# Patient Record
Sex: FEMALE | Race: WHITE | HISPANIC OR LATINO | Employment: UNEMPLOYED | URBAN - METROPOLITAN AREA
[De-identification: names, ages, dates, MRNs, and addresses within clinical notes are randomized per-mention and may not be internally consistent; named-entity substitution may affect disease eponyms.]

---

## 2020-01-14 ENCOUNTER — APPOINTMENT (OUTPATIENT)
Dept: OBGYN | Facility: CLINIC | Age: 30
End: 2020-01-14

## 2020-01-15 ENCOUNTER — HOSPITAL ENCOUNTER (OUTPATIENT)
Facility: MEDICAL CENTER | Age: 30
End: 2020-01-15
Attending: ADVANCED PRACTICE MIDWIFE
Payer: COMMERCIAL

## 2020-01-15 ENCOUNTER — HOSPITAL ENCOUNTER (OUTPATIENT)
Dept: LAB | Facility: MEDICAL CENTER | Age: 30
End: 2020-01-15
Attending: ADVANCED PRACTICE MIDWIFE
Payer: MEDICAID

## 2020-01-15 ENCOUNTER — INITIAL PRENATAL (OUTPATIENT)
Dept: OBGYN | Facility: CLINIC | Age: 30
End: 2020-01-15
Payer: MEDICAID

## 2020-01-15 VITALS
HEIGHT: 57 IN | BODY MASS INDEX: 30.63 KG/M2 | SYSTOLIC BLOOD PRESSURE: 110 MMHG | DIASTOLIC BLOOD PRESSURE: 64 MMHG | WEIGHT: 142 LBS

## 2020-01-15 DIAGNOSIS — Z34.83 ENCOUNTER FOR SUPERVISION OF OTHER NORMAL PREGNANCY, THIRD TRIMESTER: ICD-10-CM

## 2020-01-15 LAB
ABO GROUP BLD: NORMAL
BACTERIA #/AREA URNS HPF: ABNORMAL /HPF
BLD GP AB SCN SERPL QL: NORMAL
EPI CELLS #/AREA URNS HPF: ABNORMAL /HPF
HYALINE CASTS #/AREA URNS LPF: ABNORMAL /LPF
RBC # URNS HPF: ABNORMAL /HPF
RH BLD: NORMAL
WBC #/AREA URNS HPF: ABNORMAL /HPF

## 2020-01-15 PROCEDURE — 59402 PR NEW OB HIGH RISK: CPT | Performed by: ADVANCED PRACTICE MIDWIFE

## 2020-01-15 NOTE — PROGRESS NOTES
Patient friend in room identifying herself as the patient's . During this visit, medical assistant used as .     Subjective:   Fiona Alcantar is a 29 y.o.  who presents for her new OB exam.  She is 12w5d with an JANETH of Estimated Date of Delivery: 20 by LMP. She is feeling well and has no concerns at this time. Denies VB, LOF, contractions or pain. No ER visits or previous care in this pregnancy. Denies dysuria, vaginal DC, fever. Reports good fetal movement. Declines AFP.  Declines CF.      Past Medical History:   Diagnosis Date   • Asthma        Psych Hx: Patient denies any history of depression, anxiety, PTSD, bipolar or any other psychological issues.     History reviewed. No pertinent surgical history.     OB History    Para Term  AB Living   3 2 2     2   SAB TAB Ectopic Molar Multiple Live Births             2      # Outcome Date GA Lbr Maged/2nd Weight Sex Delivery Anes PTL Lv   3 Current            2 Term 17 40w0d   F Vag-Spont  N IVETT   1 Term 16 40w0d   M Vag-Spont  N IVETT        Gynecological Hx: Denies any hx of STIs, including HSV. Denies any vulvovaginal disorders and no hx of abnormal cervical cytology. Last pap unknown but patient declines today    Sexual Hx: One current male partner, who is  FOB     Contraceptive Hx: Has used Nexplanon, in the past and has since discontinued use.     Family History   Problem Relation Age of Onset   • Kidney Disease Mother    • No Known Problems Father      Denies any genetic disorders in family history.     Social History     Socioeconomic History   • Marital status: Single     Spouse name: Not on file   • Number of children: Not on file   • Years of education: Not on file   • Highest education level: Not on file   Occupational History   • Not on file   Social Needs   • Financial resource strain: Not on file   • Food insecurity:     Worry: Not on file     Inability: Not on file   •  "Transportation needs:     Medical: Not on file     Non-medical: Not on file   Tobacco Use   • Smoking status: Never Smoker   • Smokeless tobacco: Never Used   Substance and Sexual Activity   • Alcohol use: Not Currently   • Drug use: Never   • Sexual activity: Yes     Partners: Male     Comment: None    Lifestyle   • Physical activity:     Days per week: Not on file     Minutes per session: Not on file   • Stress: Not on file   Relationships   • Social connections:     Talks on phone: Not on file     Gets together: Not on file     Attends Taoist service: Not on file     Active member of club or organization: Not on file     Attends meetings of clubs or organizations: Not on file     Relationship status: Not on file   • Intimate partner violence:     Fear of current or ex partner: Not on file     Emotionally abused: Not on file     Physically abused: Not on file     Forced sexual activity: Not on file   Other Topics Concern   • Not on file   Social History Narrative   • Not on file       FOB is involved and lives with Fiona Alcantar.  Pregnancy is un planned but desired.    She is currently not working outside the home , denies any heavy lifting or exposure to potential teratogens like environmental or occupational toxins.   Denies alcohol use, drug use, or tobacco use in pregnancy.   Denies any current or hx of sexual, emotional or physical abuse or trauma.     Current Medications: PNV   Allergies: Denies allergies to medications, food, or environmental allergies    Objective:      Vitals:    01/15/20 1036   BP: 110/64   Weight: 64.4 kg (142 lb)   Height: 1.448 m (4' 9\")        See Prenatal Physical and Prenatal Vitals  UA WNL today      Assessment:      1.  IUP @ 12w5d per LMP      2.  S=D      3.  See problem list as follows     There are no active problems to display for this patient.      Clear uterine size date discrepancy as patient measures at 25 cm. She is set up for ASAP dating scan with " MD. She will then complete anatomy US. Explained to patient and friend why no ultrasound was performed today and the importance of dating scan not being for gender. They voice understanding.       Plan:   -  GC/CT done today   - Prenatal labs ordered - lab slip provided  - Discussed PNV, nutrition, adequate water intake, and exercise/weight gain in pregnancy  - NOB informational packet with anticipatory guidance given  - Reviewed Centering Pregnancy and patient is candidate.   - S/sx of pregnancy warning signs and PTL precautions given  - Complete OB US in ASAP wks  - Return to clinic in 2-3 weeks.

## 2020-01-15 NOTE — PROGRESS NOTES
Pt. Here for NOB visit today.  # [phone]  First prenatal care  Pt. States mo complaints   Pharmacy verified   AFP, too late   +FM  Pt declines CF, consent signed   Chaperone offered and not indicated  Pt declines PAP, would like to do PP.

## 2020-01-15 NOTE — LETTER
Cystic Fibrosis Carrier Testing  Fiona Alcantar    The following information is about a blood test that can be done to determine if you and/or your partner carry the gene for cystic fibrosis.    WHAT IS CYSTIC FIBROSIS?  · Cystic fibrosis (CF) is an inherited disease that affects more than 25,000 American children and young adults.  · Symptoms of CF vary but include lung congestion, pneumonia, diarrhea and poor growth.  Most people with CF have severe medical problems and some die at a young age.  Others have so few symptoms they are unaware they have CF.  · CF does not affect intelligence.  · Although there is no cure for CF at this time, scientists are making progress in improving treatment and in searching for a cure.  In the past many people with CF  at a very young age.  Today, many are living into their 20’s and 30’s.    IS THERE A CHANCE MY BABY COULD HAVE CYSTIC FIBROSIS?  · You can have a child with CF even if there is no history in your family (see chart below).  · CF testing can help determine if you are a carrier and at risk to have a child with CF.  Note: if both parents are carriers, there is a 1 in 4 (25%) chance with each pregnancy that they will have a child with CF.  · Carriers have one normal CF gene and one altered CF gene.  · People with CF have two altered CF genes.  · Most people have two normal copies of the CF gene.    Approximate risk that a couple with no family history of cystic fibrosis will have a child with cystic fibrosis:    Ethnic background / Risk     couple:  1 in 2,500   couple:  1 in 15,000            couple:  1 in 8,000     American couple:  1 in 32,000     WHAT TESTING IS AVAILABLE?  · There is a blood test that can be done to find out if you or your partner is a carrier.  · It is important to understand that CF carrier testing does not detect all CF carriers.  · If the test shows that you are both CF carriers, you  unborn baby can be tested to find out if the baby has CF.    HOW MUCH DOES IT COST TO HAVE CYSTIC FIBROSIS CARRIER TESTING?  · Cost and insurance coverage for CF carrier testing vary depending upon the laboratory used and your insurance policy.  · The average cost for CF carrier testing is $300 per person.  · Your genetic counselor can provide you with more information about cystic fibrosis carrier testing.    _____  Yes, I am interested in discussing carrier testing with a genetic counselor.    _____  No, I am not interested in CF carrier testing or in receiving more information about CF carrier testing.      Client signature: ________________________________________  1/15/2020

## 2020-01-16 PROBLEM — O09.899 RUBELLA NON-IMMUNE STATUS, ANTEPARTUM: Status: ACTIVE | Noted: 2020-01-16

## 2020-01-16 PROBLEM — Z28.39 RUBELLA NON-IMMUNE STATUS, ANTEPARTUM: Status: ACTIVE | Noted: 2020-01-16

## 2020-01-16 PROBLEM — Z34.80 SUPERVISION OF OTHER NORMAL PREGNANCY, ANTEPARTUM: Status: ACTIVE | Noted: 2020-01-16

## 2020-01-16 LAB
APPEARANCE UR: ABNORMAL
BASOPHILS # BLD AUTO: 0.3 % (ref 0–1.8)
BASOPHILS # BLD: 0.03 K/UL (ref 0–0.12)
BILIRUB UR QL STRIP.AUTO: NEGATIVE
C TRACH DNA SPEC QL NAA+PROBE: NEGATIVE
COLOR UR: YELLOW
EOSINOPHIL # BLD AUTO: 0.11 K/UL (ref 0–0.51)
EOSINOPHIL NFR BLD: 1.2 % (ref 0–6.9)
ERYTHROCYTE [DISTWIDTH] IN BLOOD BY AUTOMATED COUNT: 49.3 FL (ref 35.9–50)
GLUCOSE UR STRIP.AUTO-MCNC: NEGATIVE MG/DL
HBV SURFACE AG SER QL: NEGATIVE
HCT VFR BLD AUTO: 38.8 % (ref 37–47)
HGB BLD-MCNC: 12.4 G/DL (ref 12–16)
HIV 1+2 AB+HIV1 P24 AG SERPL QL IA: NON REACTIVE
IMM GRANULOCYTES # BLD AUTO: 0.09 K/UL (ref 0–0.11)
IMM GRANULOCYTES NFR BLD AUTO: 1 % (ref 0–0.9)
KETONES UR STRIP.AUTO-MCNC: NEGATIVE MG/DL
LEUKOCYTE ESTERASE UR QL STRIP.AUTO: ABNORMAL
LYMPHOCYTES # BLD AUTO: 2.3 K/UL (ref 1–4.8)
LYMPHOCYTES NFR BLD: 24.6 % (ref 22–41)
MCH RBC QN AUTO: 31.7 PG (ref 27–33)
MCHC RBC AUTO-ENTMCNC: 32 G/DL (ref 33.6–35)
MCV RBC AUTO: 99.2 FL (ref 81.4–97.8)
MICRO URNS: ABNORMAL
MONOCYTES # BLD AUTO: 0.61 K/UL (ref 0–0.85)
MONOCYTES NFR BLD AUTO: 6.5 % (ref 0–13.4)
N GONORRHOEA DNA SPEC QL NAA+PROBE: NEGATIVE
NEUTROPHILS # BLD AUTO: 6.22 K/UL (ref 2–7.15)
NEUTROPHILS NFR BLD: 66.4 % (ref 44–72)
NITRITE UR QL STRIP.AUTO: NEGATIVE
NRBC # BLD AUTO: 0 K/UL
NRBC BLD-RTO: 0 /100 WBC
PH UR STRIP.AUTO: 7 [PH] (ref 5–8)
PLATELET # BLD AUTO: 314 K/UL (ref 164–446)
PMV BLD AUTO: 11.3 FL (ref 9–12.9)
PROT UR QL STRIP: NEGATIVE MG/DL
RBC # BLD AUTO: 3.91 M/UL (ref 4.2–5.4)
RBC UR QL AUTO: NEGATIVE
RUBV AB SER QL: 1.3 IU/ML
SP GR UR STRIP.AUTO: 1.02
SPECIMEN SOURCE: NORMAL
TREPONEMA PALLIDUM IGG+IGM AB [PRESENCE] IN SERUM OR PLASMA BY IMMUNOASSAY: NON REACTIVE
UROBILINOGEN UR STRIP.AUTO-MCNC: 0.2 MG/DL
WBC # BLD AUTO: 9.4 K/UL (ref 4.8–10.8)

## 2020-01-17 ENCOUNTER — ROUTINE PRENATAL (OUTPATIENT)
Dept: OBGYN | Facility: CLINIC | Age: 30
End: 2020-01-17

## 2020-01-17 VITALS — DIASTOLIC BLOOD PRESSURE: 68 MMHG | WEIGHT: 147 LBS | BODY MASS INDEX: 31.81 KG/M2 | SYSTOLIC BLOOD PRESSURE: 102 MMHG

## 2020-01-17 DIAGNOSIS — Z34.80 SUPERVISION OF OTHER NORMAL PREGNANCY, ANTEPARTUM: ICD-10-CM

## 2020-01-17 DIAGNOSIS — O99.212 OBESITY COMPLICATING PREGNANCY IN SECOND TRIMESTER: ICD-10-CM

## 2020-01-17 DIAGNOSIS — Z34.90 PREGNANCY WITH FETUS OF UNKNOWN GESTATIONAL AGE: ICD-10-CM

## 2020-01-17 LAB
AMPHET CTO UR CFM-MCNC: NEGATIVE NG/ML
BACTERIA UR CULT: NORMAL
BARBITURATES CTO UR CFM-MCNC: NEGATIVE NG/ML
BENZODIAZ CTO UR CFM-MCNC: NEGATIVE NG/ML
CANNABINOIDS CTO UR CFM-MCNC: NEGATIVE NG/ML
COCAINE CTO UR CFM-MCNC: NEGATIVE NG/ML
DRUG COMMENT 753798: NORMAL
METHADONE CTO UR CFM-MCNC: NEGATIVE NG/ML
OPIATES CTO UR CFM-MCNC: NEGATIVE NG/ML
PCP CTO UR CFM-MCNC: NEGATIVE NG/ML
PROPOXYPH CTO UR CFM-MCNC: NEGATIVE NG/ML
SIGNIFICANT IND 70042: NORMAL
SITE SITE: NORMAL
SOURCE SOURCE: NORMAL

## 2020-01-17 PROCEDURE — 90040 PR PRENATAL FOLLOW UP: CPT | Performed by: OBSTETRICS & GYNECOLOGY

## 2020-01-17 PROCEDURE — 76815 OB US LIMITED FETUS(S): CPT | Performed by: OBSTETRICS & GYNECOLOGY

## 2020-01-17 NOTE — PROGRESS NOTES
S: Patient presents for follow-up and dating ultrasound due to pregnancy of unknown gestational age.  Patient is without any complaints.      O:  VSS, AF  Abdominal exam revealed fundal size to be about 22 weeks    Transabdominal ultrasound was performed and read by me:  Matos intrauterine pregnancy in variable presentation noted  Fetal heart rate is 155 bpm  Fetal biometry measurement gives a gestational age of 22 weeks and 6 days gestation  EDC by fetal biometry is 2020  Amniotic fluid appears normal    Impression: Normal intrauterine pregnancy at 22 weeks and 6 days gestation with EDC of 2020.  By LMP patient is approximately 13 weeks.  Findings were discussed with patient and EDC was changed          Assessment and plan:  29-year-old  3 para 2 at 22 weeks and 6 days gestation by ultrasound done today  EDC changed 2020  Findings were discussed with patient  Patient to follow-up in 1 to 2 weeks

## 2020-02-05 ENCOUNTER — APPOINTMENT (OUTPATIENT)
Dept: RADIOLOGY | Facility: IMAGING CENTER | Age: 30
End: 2020-02-05
Attending: ADVANCED PRACTICE MIDWIFE
Payer: MEDICAID

## 2020-02-05 ENCOUNTER — ROUTINE PRENATAL (OUTPATIENT)
Dept: OBGYN | Facility: CLINIC | Age: 30
End: 2020-02-05
Payer: MEDICAID

## 2020-02-05 ENCOUNTER — HOSPITAL ENCOUNTER (OUTPATIENT)
Dept: LAB | Facility: MEDICAL CENTER | Age: 30
End: 2020-02-05
Attending: NURSE PRACTITIONER
Payer: COMMERCIAL

## 2020-02-05 VITALS — SYSTOLIC BLOOD PRESSURE: 102 MMHG | WEIGHT: 147 LBS | BODY MASS INDEX: 31.81 KG/M2 | DIASTOLIC BLOOD PRESSURE: 70 MMHG

## 2020-02-05 DIAGNOSIS — Z34.83 ENCOUNTER FOR SUPERVISION OF OTHER NORMAL PREGNANCY, THIRD TRIMESTER: ICD-10-CM

## 2020-02-05 DIAGNOSIS — Z34.82 ENCOUNTER FOR SUPERVISION OF OTHER NORMAL PREGNANCY IN SECOND TRIMESTER: ICD-10-CM

## 2020-02-05 DIAGNOSIS — Z34.82 ENCOUNTER FOR SUPERVISION OF OTHER NORMAL PREGNANCY IN SECOND TRIMESTER: Primary | ICD-10-CM

## 2020-02-05 LAB — GLUCOSE 1H P 50 G GLC PO SERPL-MCNC: 136 MG/DL (ref 70–139)

## 2020-02-05 PROCEDURE — 76805 OB US >/= 14 WKS SNGL FETUS: CPT | Mod: TC | Performed by: OBSTETRICS & GYNECOLOGY

## 2020-02-05 PROCEDURE — 90040 PR PRENATAL FOLLOW UP: CPT | Performed by: NURSE PRACTITIONER

## 2020-02-05 NOTE — PROGRESS NOTES
OB follow up   + fetal movement.  No VB, LOF or UC's.  Wt: 147lb      BP: 102/70  Phone # [phone]  Preferred pharmacy confirmed.  US done today.

## 2020-02-05 NOTE — PROGRESS NOTES
S:  Pt is  at 25w4d for routine OB follow up.  No concerns today.  Reports good FM.  Denies VB, LOF, RUCs or vaginal DC.    O:  Please see above vitals.        FHTs: 145        Fundal ht: 25 cm.        S=D        Complete OB US: pending report, will discuss w pt at next visit.    A:  IUP at 25w4d  Patient Active Problem List    Diagnosis Date Noted   • Rubella non-immune status, antepartum 2020   • Supervision of other normal pregnancy, antepartum 2020        P:  1.  Nutrition/diet discussed        2.  Questions answered.          3.  Encourage adequate water intake.        4.  1hr GTTordered.  Lab slip and instructions given to pt.        5.   labor precautions reviewed.         6.  F/u 4 wks.

## 2020-02-06 DIAGNOSIS — O28.3 ABNORMAL FETAL ULTRASOUND: ICD-10-CM

## 2020-02-07 PROBLEM — O28.3 ABNORMAL PREGNANCY US: Status: ACTIVE | Noted: 2020-02-07

## 2020-02-13 ENCOUNTER — TELEPHONE (OUTPATIENT)
Dept: OBGYN | Facility: CLINIC | Age: 30
End: 2020-02-13

## 2020-02-13 NOTE — TELEPHONE ENCOUNTER
Notes recorded by Radha Apple C.N.M. on 2/6/2020 at 4:13 PM PST  Noted; will refer to Miguel    2/13/20 0903 Called pt to notify her of US results and inform her referral to Dr. Rivera has been faxed. Unable to reach pt or LM, phone is not taking calls at this time. Letter sent

## 2020-02-13 NOTE — PROGRESS NOTES
Referral faxed to Dr. Rivera on 2/13/20, they will contact pt to schedule an appt.  Please check with pt if appt was made. Thanks

## 2020-03-04 ENCOUNTER — HOSPITAL ENCOUNTER (OUTPATIENT)
Facility: MEDICAL CENTER | Age: 30
End: 2020-03-04
Attending: OBSTETRICS & GYNECOLOGY | Admitting: OBSTETRICS & GYNECOLOGY
Payer: MEDICAID

## 2020-03-04 VITALS
BODY MASS INDEX: 32.36 KG/M2 | TEMPERATURE: 97.7 F | HEART RATE: 97 BPM | OXYGEN SATURATION: 94 % | WEIGHT: 150 LBS | SYSTOLIC BLOOD PRESSURE: 102 MMHG | HEIGHT: 57 IN | DIASTOLIC BLOOD PRESSURE: 63 MMHG | RESPIRATION RATE: 16 BRPM

## 2020-03-04 LAB
APPEARANCE UR: ABNORMAL
COLOR UR AUTO: YELLOW
GLUCOSE UR QL STRIP.AUTO: 100 MG/DL
KETONES UR QL STRIP.AUTO: NEGATIVE MG/DL
LEUKOCYTE ESTERASE UR QL STRIP.AUTO: ABNORMAL
NITRITE UR QL STRIP.AUTO: NEGATIVE
PH UR STRIP.AUTO: 6.5 [PH] (ref 5–8)
PROT UR QL STRIP: NEGATIVE MG/DL
RBC UR QL AUTO: ABNORMAL
SP GR UR: 1.02 (ref 1–1.03)

## 2020-03-04 PROCEDURE — 81002 URINALYSIS NONAUTO W/O SCOPE: CPT

## 2020-03-04 PROCEDURE — 59025 FETAL NON-STRESS TEST: CPT

## 2020-03-04 RX ORDER — AMOXICILLIN AND CLAVULANATE POTASSIUM 500; 125 MG/1; MG/1
1 TABLET, FILM COATED ORAL 2 TIMES DAILY
Qty: 14 TAB | Refills: 0 | Status: ON HOLD | OUTPATIENT
Start: 2020-03-04 | End: 2020-05-18

## 2020-03-04 ASSESSMENT — ENCOUNTER SYMPTOMS
GASTROINTESTINAL NEGATIVE: 1
CONSTITUTIONAL NEGATIVE: 1
NEUROLOGICAL NEGATIVE: 1
PSYCHIATRIC NEGATIVE: 1
EYES NEGATIVE: 1
CARDIOVASCULAR NEGATIVE: 1
MUSCULOSKELETAL NEGATIVE: 1
RESPIRATORY NEGATIVE: 1

## 2020-03-04 ASSESSMENT — PAIN SCALES - GENERAL: PAINLEVEL: 7

## 2020-03-05 NOTE — PROGRESS NOTES
Pt is a  pt of [facility], [facility]  29.4 wks presenting to triage today with c/o of uterine contractions since this AM that have not gone away despite position changes. States +FM, -VB, -LOF. States feeling like she had a fever earlier today, and having ear aches. Denies nausea, states headache, and blurry vision that began yesterday. Denies history of any  labor.   K Nena CNM notified of pt's arrival. Report given to CNM.   Orders received to discharge pt with ABX prescription and  labor precautions.   Pt educated on medication,  labor precautions, understands to return if feeling increased cramping with bleeding, LOF, decreased FM. Encouraged to drink more water, rest her muscles, etc.   Pt and friend leaves unit ambulatory.

## 2020-03-05 NOTE — NON-PROVIDER
"S: Pt is a 29 y.o.  at 29w4d with Estimated Date of Delivery: 20 who presents to triage c/o cramping and pain pelvic pain as well as ear pain and pressure in her head . Pointing to the area of her round ligaments and notes that after being here and relaxing the sensation has gone away.  No VB,  No RUCs, No LOF.  Reports good FM.    Review of Systems   Constitutional: Negative.    HENT: Positive for ear pain.    Eyes: Negative.    Respiratory: Negative.    Cardiovascular: Negative.    Gastrointestinal: Negative.    Genitourinary: Negative.    Musculoskeletal: Negative.    Skin: Negative.    Neurological: Negative.    Endo/Heme/Allergies: Negative.    Psychiatric/Behavioral: Negative.          O: /63   Pulse 97   Temp 36.5 °C (97.7 °F) (Oral)   Resp 16   Ht 1.448 m (4' 9\")   Wt 68 kg (150 lb)   LMP 10/18/2019 (Approximate)   SpO2 94%   BMI 32.46 kg/m²    Physical Exam   Constitutional: She is oriented to person, place, and time and well-developed, well-nourished, and in no distress.   HENT:   Head: Normocephalic and atraumatic.   Eyes: Pupils are equal, round, and reactive to light. Conjunctivae and EOM are normal.   Neck: Normal range of motion.   Pulmonary/Chest: Effort normal.   Musculoskeletal: Normal range of motion.         General: No edema.   Neurological: She is alert and oriented to person, place, and time. Gait normal.   Skin: Skin is warm and dry.   Psychiatric: Mood, memory, affect and judgment normal.              FHTs: Category 1 strip,  moderate variability,  10x10 accels        [place]: ctx X1       SVE: Deferred         A/P  Patient Active Problem List    Diagnosis Date Noted   • Abnormal pregnancy US 2020   • Rubella non-immune status, antepartum 2020   • Supervision of other normal pregnancy, antepartum 2020       1.  IUP @ 29w4d  2.  Category 1 fetal heart tracing per my read   3.  Round Ligament Pain   4.  Acute Otitis Media     P:   1. Reviewed s/s " PTL  2. Discussed comfort measures for RLP and pregnancy support belt   3. Seek care w/ PCP or Urgent Care if no change in ear pain after 48 hours of medication- Rx for Augmentin 500 mg TID x 7 days.    4. F/u TPC in in 2 wks or PRN     MARISOL Daily

## 2020-03-05 NOTE — PROGRESS NOTES
"S: Pt is a 29 y.o.  at 29w4d with Estimated Date of Delivery: 20 who presents to triage c/o cramping and pain pelvic pain as well as ear pain and pressure in her head  . When questioned further about cramping and pelvic pain, pt pointing to the area of her round ligaments and notes that after being here and relaxing the sensation has gone away. Denies urinary frequency, urgency or dysuria.  No VB,  No RUCs, No LOF.  Reports good FM.    Review of Systems   Constitutional: Negative.    HENT: Positive for ear pain.    Eyes: Negative.    Respiratory: Negative.    Cardiovascular: Negative.    Gastrointestinal: Negative.    Genitourinary: Negative.    Musculoskeletal: Negative.    Skin: Negative.    Neurological: Negative.    Endo/Heme/Allergies: Negative.    Psychiatric/Behavioral: Negative.             O: /63   Pulse 97   Temp 36.5 °C (97.7 °F) (Oral)   Resp 16   Ht 1.448 m (4' 9\")   Wt 68 kg (150 lb)   LMP 10/18/2019 (Approximate)   SpO2 94%   BMI 32.46 kg/m²    Physical Exam   Constitutional: She is oriented to person, place, and time and well-developed, well-nourished, and in no distress.   HENT:   Head: Normocephalic and atraumatic.   Eyes: Pupils are equal, round, and reactive to light. Conjunctivae and EOM are normal.   Neck: Normal range of motion.   Pulmonary/Chest: Effort normal.   Musculoskeletal: Normal range of motion.         General: No edema.   Neurological: She is alert and oriented to person, place, and time. Gait normal.   Skin: Skin is warm and dry.   Psychiatric: Mood, memory, affect and judgment normal.                 FHTs: Category 1 strip,  moderate variability,  10x10 accels        [place]: ctx X1       SVE: Deferred         A/P       Patient Active Problem List     Diagnosis Date Noted   • Abnormal pregnancy US 2020   • Rubella non-immune status, antepartum 2020   • Supervision of other normal pregnancy, antepartum 2020         1.  IUP @ 29w4d  2.  " Category 1 fetal heart tracing per my read   3.  Round Ligament Pain   4.  Acute Otitis Media      P:   1. Reviewed s/s PTL  2. Discussed comfort measures for RLP and pregnancy support belt   3. Seek care w/ PCP or Urgent Care if no change in ear pain after 48 hours of medication- Rx for Augmentin 500 mg TID x 7 days.    4. F/u TPC in in 2 wks or PRN

## 2020-05-11 ENCOUNTER — ROUTINE PRENATAL (OUTPATIENT)
Dept: OBGYN | Facility: CLINIC | Age: 30
End: 2020-05-11

## 2020-05-11 ENCOUNTER — HOSPITAL ENCOUNTER (OUTPATIENT)
Facility: MEDICAL CENTER | Age: 30
End: 2020-05-11
Attending: NURSE PRACTITIONER
Payer: COMMERCIAL

## 2020-05-11 ENCOUNTER — APPOINTMENT (OUTPATIENT)
Dept: OBGYN | Facility: CLINIC | Age: 30
End: 2020-05-11

## 2020-05-11 VITALS — DIASTOLIC BLOOD PRESSURE: 74 MMHG | SYSTOLIC BLOOD PRESSURE: 116 MMHG | BODY MASS INDEX: 35.27 KG/M2 | WEIGHT: 163 LBS

## 2020-05-11 DIAGNOSIS — Z34.80 SUPERVISION OF OTHER NORMAL PREGNANCY, ANTEPARTUM: ICD-10-CM

## 2020-05-11 DIAGNOSIS — Z34.80 SUPERVISION OF OTHER NORMAL PREGNANCY, ANTEPARTUM: Primary | ICD-10-CM

## 2020-05-11 DIAGNOSIS — O36.8130 DECREASED FETAL MOVEMENTS IN THIRD TRIMESTER, SINGLE OR UNSPECIFIED FETUS: ICD-10-CM

## 2020-05-11 DIAGNOSIS — O28.3 ABNORMAL PREGNANCY US: ICD-10-CM

## 2020-05-11 LAB
NST ACOUSTIC STIMULATION: NORMAL
NST ACTION NECESSARY: NORMAL
NST ASSESSMENT: NORMAL
NST BASELINE: NORMAL
NST INDICATIONS: NORMAL
NST OTHER DATA: NORMAL
NST READ BY: NORMAL
NST RETURN: NORMAL
NST UTERINE ACTIVITY: NORMAL

## 2020-05-11 PROCEDURE — 59025 FETAL NON-STRESS TEST: CPT | Performed by: NURSE PRACTITIONER

## 2020-05-11 PROCEDURE — 90040 PR PRENATAL FOLLOW UP: CPT | Performed by: NURSE PRACTITIONER

## 2020-05-11 NOTE — PROGRESS NOTES
Pt here today for OB follow up  Pt states no complaints   Reports +FM  Good # [phone]  Pharmacy Confirmed.  Chaperone offered and not inducated.   Pt given JAYRO sheet and instructions   Pt declines BTL  Pt declines TDAP  GBS today   Pt states she never saw .

## 2020-05-11 NOTE — PATIENT INSTRUCTIONS
P:  1.  GBS obtained.          2.  Labor precautions given.  Instructions given on where to go.  Pt receptive to              education.          3.  Questions answered.          4.  Instructions given on FKCs.          5.  Encouraged adequate water intake        6.  F/u 1 wk.        7.  IOL referral placed for 41wks per pt request.        8.  L&D policies reviewed w pt.         9.  Encouraged good hand hygiene, social distancing and avoiding sick contacts.       10.  Declined BTL.

## 2020-05-11 NOTE — PROGRESS NOTES
S:  Pt is  at 39w2d here for routine OB follow up.  No c/o.  Reports decr FM x 3 days.  Denies VB, LOF, RUCs, or vaginal DC. Denies  cough, SOB, sore throat or fever.  Denies exposure to anyone with COVID 19.    O:  Please see above vitals.        FHTs: 161        Fundal ht: 38 cm.        Fetal position: vertex.        SVE: 4/60/-3        NST obtained: baseline 140s +accels -decels mod variability.  Several FM appreciated.        [place]: quiet    A:  IUP at 39w2d  Reactive NST Cat I FHTs   Patient Active Problem List    Diagnosis Date Noted   • Abnormal pregnancy US 2020   • Rubella non-immune status, antepartum 2020   • Supervision of other normal pregnancy, antepartum 2020       P:  1.  GBS obtained.          2.  Labor precautions given.  Instructions given on where to go.  Pt receptive to              education.          3.  Questions answered.          4.  Instructions given on FKCs.          5.  Encouraged adequate water intake        6.  F/u 1 wk.        7.  IOL referral placed for 41wks per pt request.        8.  L&D policies reviewed w pt.         9.  Encouraged good hand hygiene, social distancing and avoiding sick contacts.       10.  Declined BTL.    Chaperone offered and provided by Lyric Shaw MA.

## 2020-05-11 NOTE — LETTER
Cuente los Movimientos de cuevas Bebé  Otro paso importante para la rupesh de cuevas bebé    Fiona Lopez [facility]            Dept: [phone]    ¿Cuántas semanas tiene de embarazo? 39w2d    Fecha cuando tiene que comenzar a contar el movimiento: 5/11/2020                  [place] debe usar kyle diagrama    Orlin manera en que cuevas doctor puede controlar a rupesh de cuevas bebé es sabiendo cuantas veces se mueve cuevas bebé en el útero, o por medio de las “pataditas”.  Usted podrá ayudarle a cuevas médico al usar cada día el siguiente diagrama.    Cada día, usted debe prestar atención a cuantas horas le lleva a cuevas bebé moverse 10 veces.  Comience a contar en la mañana, lo antes posible después de haberse levantado.    · Primeramente, escriba la hora en que se mueve cuevas bebé, hasta llegar a 10 veces.  · Colóquele un check o palomita a cada cuadrito cada vez que cuevas bebé se mueva hasta que complete 10 veces.  · Escriba la hora cuando termine de contar 10 veces en la última columna.  · Sume el total del tiempo que le llevó contar los 10 movimientos.  · Finalmente, complete el cuadrito de cuantas horas le llevó hacerlo.    Después de magdy contado los 10 movimientos, ya no tendrá que contar los demás movimientos por el emily del día.  A la mañana siguiente, comience a contar de nuevo cuantas veces se mueve el bebé desde el momento en que se levante.    ¿Qué tendría que considerarse un “movimiento”?  Es difícil de decirlo porque es distinto de orlin madre a otra, y de un embarazo a otro.  Lo importante es que cuente el movimiento de la misma manera keo el transcurso de cuevas embarazo.  Si tiene preguntas adicionales, pregúntele a cuevas doctor.    ¡Cuente cuidadosamente cada día!     MUESTRA:  Semana 28    ¿Cuántas horas le ha llevado sentir 10 movimientos?        Hora de Inicio     1     2     3     4     5     6     7     8     9     10   Hora de Finlizar   Reina. 8:20 ·  ·  ·  ·  ·  ·  ·  ·  ·  ·   11:40   Mar.               Mié.               Anjana.               Barbarae.               Sáb.               Dom.                 IMPORTANTE:  Usted debe contactar a cuevas doctor si le lleva más de 2 horas sentir 10 movimientos de cuevas bebé.    Cada mañana, escriba la hora de inicio y comience a contar los movimientos de cuevas bebé.  Hágalo colocándole un check o palomita a cada cuadrito cada vez que sienta un movimiento de cuevas bebé.  Cuando haya sentido 10 “pataditas”, escriba la hora en que terminó de contar en la última columna.  Luego, complete en la cajita (arriba de la ismael de check o palomita) el número total de horas que le llevó hacerlo.  Asegúrese de leer completamente las instrucciones en la página anterior.

## 2020-05-13 LAB — GP B STREP DNA SPEC QL NAA+PROBE: NEGATIVE

## 2020-05-18 ENCOUNTER — ROUTINE PRENATAL (OUTPATIENT)
Dept: OBGYN | Facility: CLINIC | Age: 30
End: 2020-05-18

## 2020-05-18 ENCOUNTER — APPOINTMENT (OUTPATIENT)
Dept: RADIOLOGY | Facility: MEDICAL CENTER | Age: 30
End: 2020-05-18
Attending: OBSTETRICS & GYNECOLOGY
Payer: MEDICAID

## 2020-05-18 ENCOUNTER — HOSPITAL ENCOUNTER (INPATIENT)
Facility: MEDICAL CENTER | Age: 30
LOS: 4 days | End: 2020-05-22
Attending: OBSTETRICS & GYNECOLOGY | Admitting: FAMILY MEDICINE
Payer: MEDICAID

## 2020-05-18 VITALS — WEIGHT: 168 LBS | BODY MASS INDEX: 36.35 KG/M2 | SYSTOLIC BLOOD PRESSURE: 120 MMHG | DIASTOLIC BLOOD PRESSURE: 76 MMHG

## 2020-05-18 DIAGNOSIS — Z98.891 HISTORY OF CESAREAN SECTION: ICD-10-CM

## 2020-05-18 DIAGNOSIS — O48.0 POST-TERM PREGNANCY, 40-42 WEEKS OF GESTATION: ICD-10-CM

## 2020-05-18 LAB
BASOPHILS # BLD AUTO: 0.2 % (ref 0–1.8)
BASOPHILS # BLD: 0.02 K/UL (ref 0–0.12)
EOSINOPHIL # BLD AUTO: 0.07 K/UL (ref 0–0.51)
EOSINOPHIL NFR BLD: 0.7 % (ref 0–6.9)
ERYTHROCYTE [DISTWIDTH] IN BLOOD BY AUTOMATED COUNT: 50.2 FL (ref 35.9–50)
HCT VFR BLD AUTO: 32.6 % (ref 37–47)
HGB BLD-MCNC: 10.5 G/DL (ref 12–16)
HOLDING TUBE BB 8507: NORMAL
IMM GRANULOCYTES # BLD AUTO: 0.1 K/UL (ref 0–0.11)
IMM GRANULOCYTES NFR BLD AUTO: 1 % (ref 0–0.9)
LYMPHOCYTES # BLD AUTO: 2.01 K/UL (ref 1–4.8)
LYMPHOCYTES NFR BLD: 20.4 % (ref 22–41)
MCH RBC QN AUTO: 30.1 PG (ref 27–33)
MCHC RBC AUTO-ENTMCNC: 32.2 G/DL (ref 33.6–35)
MCV RBC AUTO: 93.4 FL (ref 81.4–97.8)
MONOCYTES # BLD AUTO: 0.85 K/UL (ref 0–0.85)
MONOCYTES NFR BLD AUTO: 8.6 % (ref 0–13.4)
NEUTROPHILS # BLD AUTO: 6.8 K/UL (ref 2–7.15)
NEUTROPHILS NFR BLD: 69.1 % (ref 44–72)
NRBC # BLD AUTO: 0 K/UL
NRBC BLD-RTO: 0 /100 WBC
NST ACOUSTIC STIMULATION: NORMAL
NST ACTION NECESSARY: NORMAL
NST ASSESSMENT: NORMAL
NST BASELINE: NORMAL
NST INDICATIONS: NORMAL
NST OTHER DATA: NORMAL
NST READ BY: NORMAL
NST RETURN: NORMAL
NST UTERINE ACTIVITY: NORMAL
PLATELET # BLD AUTO: 221 K/UL (ref 164–446)
PMV BLD AUTO: 11.8 FL (ref 9–12.9)
RBC # BLD AUTO: 3.49 M/UL (ref 4.2–5.4)
WBC # BLD AUTO: 9.9 K/UL (ref 4.8–10.8)

## 2020-05-18 PROCEDURE — 85025 COMPLETE CBC W/AUTO DIFF WBC: CPT

## 2020-05-18 PROCEDURE — 90040 PR PRENATAL FOLLOW UP: CPT | Performed by: OBSTETRICS & GYNECOLOGY

## 2020-05-18 PROCEDURE — 59025 FETAL NON-STRESS TEST: CPT | Performed by: OBSTETRICS & GYNECOLOGY

## 2020-05-18 PROCEDURE — 770002 HCHG ROOM/CARE - OB PRIVATE (112)

## 2020-05-18 PROCEDURE — 700105 HCHG RX REV CODE 258: Performed by: FAMILY MEDICINE

## 2020-05-18 PROCEDURE — 3E033VJ INTRODUCTION OF OTHER HORMONE INTO PERIPHERAL VEIN, PERCUTANEOUS APPROACH: ICD-10-PCS | Performed by: OBSTETRICS & GYNECOLOGY

## 2020-05-18 PROCEDURE — 36415 COLL VENOUS BLD VENIPUNCTURE: CPT

## 2020-05-18 PROCEDURE — 700111 HCHG RX REV CODE 636 W/ 250 OVERRIDE (IP): Performed by: FAMILY MEDICINE

## 2020-05-18 RX ORDER — IBUPROFEN 600 MG/1
600 TABLET ORAL EVERY 6 HOURS PRN
Status: DISCONTINUED | OUTPATIENT
Start: 2020-05-18 | End: 2020-05-20

## 2020-05-18 RX ORDER — MISOPROSTOL 200 UG/1
800 TABLET ORAL
Status: DISCONTINUED | OUTPATIENT
Start: 2020-05-18 | End: 2020-05-19 | Stop reason: HOSPADM

## 2020-05-18 RX ORDER — METHYLERGONOVINE MALEATE 0.2 MG/ML
0.2 INJECTION INTRAVENOUS
Status: DISCONTINUED | OUTPATIENT
Start: 2020-05-18 | End: 2020-05-19 | Stop reason: HOSPADM

## 2020-05-18 RX ORDER — ACETAMINOPHEN 325 MG/1
325 TABLET ORAL EVERY 4 HOURS PRN
Status: DISCONTINUED | OUTPATIENT
Start: 2020-05-18 | End: 2020-05-20

## 2020-05-18 RX ORDER — HYDROCODONE BITARTRATE AND ACETAMINOPHEN 10; 325 MG/1; MG/1
1 TABLET ORAL EVERY 4 HOURS PRN
Status: DISCONTINUED | OUTPATIENT
Start: 2020-05-18 | End: 2020-05-20

## 2020-05-18 RX ORDER — CARBOPROST TROMETHAMINE 250 UG/ML
250 INJECTION, SOLUTION INTRAMUSCULAR
Status: DISCONTINUED | OUTPATIENT
Start: 2020-05-18 | End: 2020-05-19 | Stop reason: HOSPADM

## 2020-05-18 RX ORDER — SODIUM CHLORIDE, SODIUM LACTATE, POTASSIUM CHLORIDE, CALCIUM CHLORIDE 600; 310; 30; 20 MG/100ML; MG/100ML; MG/100ML; MG/100ML
INJECTION, SOLUTION INTRAVENOUS CONTINUOUS
Status: DISPENSED | OUTPATIENT
Start: 2020-05-18 | End: 2020-05-19

## 2020-05-18 RX ORDER — HYDROCODONE BITARTRATE AND ACETAMINOPHEN 5; 325 MG/1; MG/1
1 TABLET ORAL EVERY 4 HOURS PRN
Status: DISCONTINUED | OUTPATIENT
Start: 2020-05-18 | End: 2020-05-20

## 2020-05-18 RX ADMIN — SODIUM CHLORIDE, POTASSIUM CHLORIDE, SODIUM LACTATE AND CALCIUM CHLORIDE: 600; 310; 30; 20 INJECTION, SOLUTION INTRAVENOUS at 19:04

## 2020-05-18 RX ADMIN — Medication 2 MILLI-UNITS/MIN: at 19:04

## 2020-05-18 ASSESSMENT — PATIENT HEALTH QUESTIONNAIRE - PHQ9
SUM OF ALL RESPONSES TO PHQ9 QUESTIONS 1 AND 2: 0
2. FEELING DOWN, DEPRESSED, IRRITABLE, OR HOPELESS: NOT AT ALL
1. LITTLE INTEREST OR PLEASURE IN DOING THINGS: NOT AT ALL

## 2020-05-18 ASSESSMENT — LIFESTYLE VARIABLES
TOTAL SCORE: 0
ON A TYPICAL DAY WHEN YOU DRINK ALCOHOL HOW MANY DRINKS DO YOU HAVE: 0
EVER HAD A DRINK FIRST THING IN THE MORNING TO STEADY YOUR NERVES TO GET RID OF A HANGOVER: NO
EVER FELT BAD OR GUILTY ABOUT YOUR DRINKING: NO
EVER_SMOKED: NEVER
CONSUMPTION TOTAL: NEGATIVE
AVERAGE NUMBER OF DAYS PER WEEK YOU HAVE A DRINK CONTAINING ALCOHOL: 0
HAVE YOU EVER FELT YOU SHOULD CUT DOWN ON YOUR DRINKING: NO
TOTAL SCORE: 0
ALCOHOL_USE: NO
HAVE PEOPLE ANNOYED YOU BY CRITICIZING YOUR DRINKING: NO
TOTAL SCORE: 0
HOW MANY TIMES IN THE PAST YEAR HAVE YOU HAD 5 OR MORE DRINKS IN A DAY: 0

## 2020-05-18 NOTE — PROGRESS NOTES
OB Followup;    40w2d    Patient Active Problem List    Diagnosis Date Noted   • Abnormal pregnancy US 2020   • Rubella non-immune status, antepartum 2020   • Supervision of other normal pregnancy, antepartum 2020       Vitals:    20 1440   BP: 120/76   Weight: 76.2 kg (168 lb)       Patient presents for followup of OB care. Currently doing well . Good fetal movement no leakage of fluid no contractions or vaginal bleeding        Size equals dates, normal fetal heart rate      Cervix-1.5 cm dilated 70% effaced/soft/anterior    Patient scheduled for induction of labor secondary to postdates on     NST today-nonreactive without decelerations-sent to labor and delivery for biophysical profile discussed with triage nurse at 1540 hrs.    Labor precautions given  Increase oral hydration  Discussed proper weight gain during pregnancy  Continue prenatal vitamins  Signs and symptoms of labor/ labor discussed

## 2020-05-18 NOTE — PROGRESS NOTES
OB follow up   + fetal movement.  No VB, LOF  C/o irregular UC's.  IOL scheduled for 5/23/2020  Phone # [phone]  Preferred pharmacy confirmed.

## 2020-05-19 ENCOUNTER — ANESTHESIA EVENT (OUTPATIENT)
Dept: OBGYN | Facility: MEDICAL CENTER | Age: 30
End: 2020-05-19
Payer: MEDICAID

## 2020-05-19 ENCOUNTER — ANESTHESIA (OUTPATIENT)
Dept: OBGYN | Facility: MEDICAL CENTER | Age: 30
End: 2020-05-19
Payer: MEDICAID

## 2020-05-19 PROCEDURE — A9270 NON-COVERED ITEM OR SERVICE: HCPCS | Performed by: ANESTHESIOLOGY

## 2020-05-19 PROCEDURE — 700111 HCHG RX REV CODE 636 W/ 250 OVERRIDE (IP): Performed by: ANESTHESIOLOGY

## 2020-05-19 PROCEDURE — A4450 NON-WATERPROOF TAPE: HCPCS | Performed by: OBSTETRICS & GYNECOLOGY

## 2020-05-19 PROCEDURE — 59514 CESAREAN DELIVERY ONLY: CPT | Performed by: OBSTETRICS & GYNECOLOGY

## 2020-05-19 PROCEDURE — 700111 HCHG RX REV CODE 636 W/ 250 OVERRIDE (IP)

## 2020-05-19 PROCEDURE — 303615 HCHG EPIDURAL/SPINAL ANESTHESIA FOR LABOR

## 2020-05-19 PROCEDURE — 700105 HCHG RX REV CODE 258: Performed by: ANESTHESIOLOGY

## 2020-05-19 PROCEDURE — 770002 HCHG ROOM/CARE - OB PRIVATE (112)

## 2020-05-19 PROCEDURE — 160009 HCHG ANES TIME/MIN: Performed by: OBSTETRICS & GYNECOLOGY

## 2020-05-19 PROCEDURE — 160002 HCHG RECOVERY MINUTES (STAT): Performed by: OBSTETRICS & GYNECOLOGY

## 2020-05-19 PROCEDURE — 160041 HCHG SURGERY MINUTES - EA ADDL 1 MIN LEVEL 4: Performed by: OBSTETRICS & GYNECOLOGY

## 2020-05-19 PROCEDURE — 700101 HCHG RX REV CODE 250: Performed by: ANESTHESIOLOGY

## 2020-05-19 PROCEDURE — 700102 HCHG RX REV CODE 250 W/ 637 OVERRIDE(OP): Performed by: ANESTHESIOLOGY

## 2020-05-19 PROCEDURE — 700111 HCHG RX REV CODE 636 W/ 250 OVERRIDE (IP): Performed by: FAMILY MEDICINE

## 2020-05-19 PROCEDURE — 160035 HCHG PACU - 1ST 60 MINS PHASE I: Performed by: OBSTETRICS & GYNECOLOGY

## 2020-05-19 PROCEDURE — 700111 HCHG RX REV CODE 636 W/ 250 OVERRIDE (IP): Performed by: OBSTETRICS & GYNECOLOGY

## 2020-05-19 PROCEDURE — 59514 CESAREAN DELIVERY ONLY: CPT

## 2020-05-19 PROCEDURE — 501445 HCHG STAPLER, SKIN DISP: Performed by: OBSTETRICS & GYNECOLOGY

## 2020-05-19 PROCEDURE — 500429 HCHG DRESSING, INTERCEED: Performed by: OBSTETRICS & GYNECOLOGY

## 2020-05-19 PROCEDURE — 160048 HCHG OR STATISTICAL LEVEL 1-5: Performed by: OBSTETRICS & GYNECOLOGY

## 2020-05-19 PROCEDURE — 160029 HCHG SURGERY MINUTES - 1ST 30 MINS LEVEL 4: Performed by: OBSTETRICS & GYNECOLOGY

## 2020-05-19 PROCEDURE — 59514 CESAREAN DELIVERY ONLY: CPT | Mod: AS | Performed by: NURSE PRACTITIONER

## 2020-05-19 PROCEDURE — 700101 HCHG RX REV CODE 250

## 2020-05-19 PROCEDURE — 700105 HCHG RX REV CODE 258: Performed by: OBSTETRICS & GYNECOLOGY

## 2020-05-19 PROCEDURE — 304965 HCHG RECOVERY SERVICES

## 2020-05-19 PROCEDURE — 700105 HCHG RX REV CODE 258: Performed by: FAMILY MEDICINE

## 2020-05-19 RX ORDER — METOCLOPRAMIDE HYDROCHLORIDE 5 MG/ML
10 INJECTION INTRAMUSCULAR; INTRAVENOUS ONCE
Status: COMPLETED | OUTPATIENT
Start: 2020-05-19 | End: 2020-05-19

## 2020-05-19 RX ORDER — OXYTOCIN 10 [USP'U]/ML
INJECTION, SOLUTION INTRAMUSCULAR; INTRAVENOUS PRN
Status: DISCONTINUED | OUTPATIENT
Start: 2020-05-19 | End: 2020-05-19 | Stop reason: SURG

## 2020-05-19 RX ORDER — SODIUM CHLORIDE, SODIUM GLUCONATE, SODIUM ACETATE, POTASSIUM CHLORIDE AND MAGNESIUM CHLORIDE 526; 502; 368; 37; 30 MG/100ML; MG/100ML; MG/100ML; MG/100ML; MG/100ML
1500 INJECTION, SOLUTION INTRAVENOUS ONCE
Status: COMPLETED | OUTPATIENT
Start: 2020-05-19 | End: 2020-05-19

## 2020-05-19 RX ORDER — DIPHENHYDRAMINE HYDROCHLORIDE 50 MG/ML
12.5 INJECTION INTRAMUSCULAR; INTRAVENOUS EVERY 6 HOURS PRN
Status: DISCONTINUED | OUTPATIENT
Start: 2020-05-19 | End: 2020-05-20

## 2020-05-19 RX ORDER — MORPHINE SULFATE 0.5 MG/ML
INJECTION, SOLUTION EPIDURAL; INTRATHECAL; INTRAVENOUS PRN
Status: DISCONTINUED | OUTPATIENT
Start: 2020-05-19 | End: 2020-05-19 | Stop reason: SURG

## 2020-05-19 RX ORDER — HYDROMORPHONE HYDROCHLORIDE 1 MG/ML
0.4 INJECTION, SOLUTION INTRAMUSCULAR; INTRAVENOUS; SUBCUTANEOUS
Status: DISCONTINUED | OUTPATIENT
Start: 2020-05-19 | End: 2020-05-19 | Stop reason: HOSPADM

## 2020-05-19 RX ORDER — SODIUM CHLORIDE, SODIUM LACTATE, POTASSIUM CHLORIDE, AND CALCIUM CHLORIDE .6; .31; .03; .02 G/100ML; G/100ML; G/100ML; G/100ML
250 INJECTION, SOLUTION INTRAVENOUS PRN
Status: DISCONTINUED | OUTPATIENT
Start: 2020-05-19 | End: 2020-05-19 | Stop reason: HOSPADM

## 2020-05-19 RX ORDER — ONDANSETRON 2 MG/ML
INJECTION INTRAMUSCULAR; INTRAVENOUS PRN
Status: DISCONTINUED | OUTPATIENT
Start: 2020-05-19 | End: 2020-05-19 | Stop reason: SURG

## 2020-05-19 RX ORDER — CEFAZOLIN SODIUM 1 G/3ML
INJECTION, POWDER, FOR SOLUTION INTRAMUSCULAR; INTRAVENOUS PRN
Status: DISCONTINUED | OUTPATIENT
Start: 2020-05-19 | End: 2020-05-19 | Stop reason: SURG

## 2020-05-19 RX ORDER — HYDROMORPHONE HYDROCHLORIDE 1 MG/ML
0.2 INJECTION, SOLUTION INTRAMUSCULAR; INTRAVENOUS; SUBCUTANEOUS
Status: DISCONTINUED | OUTPATIENT
Start: 2020-05-19 | End: 2020-05-20

## 2020-05-19 RX ORDER — OXYCODONE HYDROCHLORIDE 5 MG/1
5 TABLET ORAL EVERY 4 HOURS PRN
Status: DISCONTINUED | OUTPATIENT
Start: 2020-05-19 | End: 2020-05-20

## 2020-05-19 RX ORDER — LIDOCAINE HCL/EPINEPHRINE/PF 2%-1:200K
VIAL (ML) INJECTION PRN
Status: DISCONTINUED | OUTPATIENT
Start: 2020-05-19 | End: 2020-05-19 | Stop reason: SURG

## 2020-05-19 RX ORDER — LIDOCAINE HYDROCHLORIDE AND EPINEPHRINE 15; 5 MG/ML; UG/ML
INJECTION, SOLUTION EPIDURAL PRN
Status: DISCONTINUED | OUTPATIENT
Start: 2020-05-19 | End: 2020-05-19 | Stop reason: SURG

## 2020-05-19 RX ORDER — OXYCODONE HCL 5 MG/5 ML
5 SOLUTION, ORAL ORAL
Status: DISCONTINUED | OUTPATIENT
Start: 2020-05-19 | End: 2020-05-19 | Stop reason: HOSPADM

## 2020-05-19 RX ORDER — SODIUM CHLORIDE, SODIUM LACTATE, POTASSIUM CHLORIDE, AND CALCIUM CHLORIDE .6; .31; .03; .02 G/100ML; G/100ML; G/100ML; G/100ML
1000 INJECTION, SOLUTION INTRAVENOUS
Status: DISCONTINUED | OUTPATIENT
Start: 2020-05-19 | End: 2020-05-19 | Stop reason: HOSPADM

## 2020-05-19 RX ORDER — OXYCODONE HCL 5 MG/5 ML
10 SOLUTION, ORAL ORAL
Status: DISCONTINUED | OUTPATIENT
Start: 2020-05-19 | End: 2020-05-19 | Stop reason: HOSPADM

## 2020-05-19 RX ORDER — OXYCODONE HYDROCHLORIDE 10 MG/1
10 TABLET ORAL EVERY 4 HOURS PRN
Status: DISCONTINUED | OUTPATIENT
Start: 2020-05-19 | End: 2020-05-20

## 2020-05-19 RX ORDER — DOCUSATE SODIUM 100 MG/1
100 CAPSULE, LIQUID FILLED ORAL 2 TIMES DAILY PRN
Status: DISCONTINUED | OUTPATIENT
Start: 2020-05-19 | End: 2020-05-20

## 2020-05-19 RX ORDER — ACETAMINOPHEN 500 MG
1000 TABLET ORAL EVERY 6 HOURS
Status: COMPLETED | OUTPATIENT
Start: 2020-05-19 | End: 2020-05-20

## 2020-05-19 RX ORDER — HALOPERIDOL 5 MG/ML
1 INJECTION INTRAMUSCULAR
Status: DISCONTINUED | OUTPATIENT
Start: 2020-05-19 | End: 2020-05-19 | Stop reason: HOSPADM

## 2020-05-19 RX ORDER — ROPIVACAINE HYDROCHLORIDE 2 MG/ML
INJECTION, SOLUTION EPIDURAL; INFILTRATION; PERINEURAL CONTINUOUS
Status: DISCONTINUED | OUTPATIENT
Start: 2020-05-19 | End: 2020-05-20

## 2020-05-19 RX ORDER — SODIUM CHLORIDE, SODIUM GLUCONATE, SODIUM ACETATE, POTASSIUM CHLORIDE AND MAGNESIUM CHLORIDE 526; 502; 368; 37; 30 MG/100ML; MG/100ML; MG/100ML; MG/100ML; MG/100ML
INJECTION, SOLUTION INTRAVENOUS
Status: DISCONTINUED | OUTPATIENT
Start: 2020-05-19 | End: 2020-05-19 | Stop reason: SURG

## 2020-05-19 RX ORDER — SODIUM CHLORIDE, SODIUM LACTATE, POTASSIUM CHLORIDE, CALCIUM CHLORIDE 600; 310; 30; 20 MG/100ML; MG/100ML; MG/100ML; MG/100ML
INJECTION, SOLUTION INTRAVENOUS CONTINUOUS
Status: DISCONTINUED | OUTPATIENT
Start: 2020-05-19 | End: 2020-05-20

## 2020-05-19 RX ORDER — KETOROLAC TROMETHAMINE 30 MG/ML
30 INJECTION, SOLUTION INTRAMUSCULAR; INTRAVENOUS EVERY 6 HOURS
Status: COMPLETED | OUTPATIENT
Start: 2020-05-19 | End: 2020-05-20

## 2020-05-19 RX ORDER — DEXAMETHASONE SODIUM PHOSPHATE 4 MG/ML
INJECTION, SOLUTION INTRA-ARTICULAR; INTRALESIONAL; INTRAMUSCULAR; INTRAVENOUS; SOFT TISSUE PRN
Status: DISCONTINUED | OUTPATIENT
Start: 2020-05-19 | End: 2020-05-19 | Stop reason: SURG

## 2020-05-19 RX ORDER — ROPIVACAINE HYDROCHLORIDE 2 MG/ML
INJECTION, SOLUTION EPIDURAL; INFILTRATION PRN
Status: DISCONTINUED | OUTPATIENT
Start: 2020-05-19 | End: 2020-05-19 | Stop reason: SURG

## 2020-05-19 RX ORDER — HYDROMORPHONE HYDROCHLORIDE 1 MG/ML
0.2 INJECTION, SOLUTION INTRAMUSCULAR; INTRAVENOUS; SUBCUTANEOUS
Status: DISCONTINUED | OUTPATIENT
Start: 2020-05-19 | End: 2020-05-19 | Stop reason: HOSPADM

## 2020-05-19 RX ORDER — HYDROMORPHONE HYDROCHLORIDE 1 MG/ML
0.4 INJECTION, SOLUTION INTRAMUSCULAR; INTRAVENOUS; SUBCUTANEOUS
Status: DISCONTINUED | OUTPATIENT
Start: 2020-05-19 | End: 2020-05-20

## 2020-05-19 RX ORDER — HYDROMORPHONE HYDROCHLORIDE 1 MG/ML
0.1 INJECTION, SOLUTION INTRAMUSCULAR; INTRAVENOUS; SUBCUTANEOUS
Status: DISCONTINUED | OUTPATIENT
Start: 2020-05-19 | End: 2020-05-19 | Stop reason: HOSPADM

## 2020-05-19 RX ORDER — SODIUM CHLORIDE, SODIUM LACTATE, POTASSIUM CHLORIDE, CALCIUM CHLORIDE 600; 310; 30; 20 MG/100ML; MG/100ML; MG/100ML; MG/100ML
INJECTION, SOLUTION INTRAVENOUS PRN
Status: DISCONTINUED | OUTPATIENT
Start: 2020-05-19 | End: 2020-05-20

## 2020-05-19 RX ORDER — MEPERIDINE HYDROCHLORIDE 25 MG/ML
12.5 INJECTION INTRAMUSCULAR; INTRAVENOUS; SUBCUTANEOUS
Status: DISCONTINUED | OUTPATIENT
Start: 2020-05-19 | End: 2020-05-19 | Stop reason: HOSPADM

## 2020-05-19 RX ORDER — ONDANSETRON 2 MG/ML
4 INJECTION INTRAMUSCULAR; INTRAVENOUS
Status: DISCONTINUED | OUTPATIENT
Start: 2020-05-19 | End: 2020-05-19 | Stop reason: HOSPADM

## 2020-05-19 RX ORDER — ONDANSETRON 2 MG/ML
4 INJECTION INTRAMUSCULAR; INTRAVENOUS EVERY 6 HOURS PRN
Status: DISCONTINUED | OUTPATIENT
Start: 2020-05-19 | End: 2020-05-20

## 2020-05-19 RX ORDER — KETOROLAC TROMETHAMINE 30 MG/ML
INJECTION, SOLUTION INTRAMUSCULAR; INTRAVENOUS PRN
Status: DISCONTINUED | OUTPATIENT
Start: 2020-05-19 | End: 2020-05-19 | Stop reason: SURG

## 2020-05-19 RX ORDER — CITRIC ACID/SODIUM CITRATE 334-500MG
30 SOLUTION, ORAL ORAL ONCE
Status: COMPLETED | OUTPATIENT
Start: 2020-05-19 | End: 2020-05-19

## 2020-05-19 RX ORDER — ROPIVACAINE HYDROCHLORIDE 2 MG/ML
INJECTION, SOLUTION EPIDURAL; INFILTRATION; PERINEURAL
Status: COMPLETED
Start: 2020-05-19 | End: 2020-05-19

## 2020-05-19 RX ADMIN — EPHEDRINE SULFATE 10 MG: 50 INJECTION INTRAVENOUS at 03:26

## 2020-05-19 RX ADMIN — LIDOCAINE HYDROCHLORIDE,EPINEPHRINE BITARTRATE 5 ML: 20; .005 INJECTION, SOLUTION EPIDURAL; INFILTRATION; INTRACAUDAL; PERINEURAL at 05:23

## 2020-05-19 RX ADMIN — LIDOCAINE HYDROCHLORIDE,EPINEPHRINE BITARTRATE 5 ML: 20; .005 INJECTION, SOLUTION EPIDURAL; INFILTRATION; INTRACAUDAL; PERINEURAL at 05:22

## 2020-05-19 RX ADMIN — AZITHROMYCIN MONOHYDRATE 500 MG: 500 INJECTION, POWDER, LYOPHILIZED, FOR SOLUTION INTRAVENOUS at 05:40

## 2020-05-19 RX ADMIN — ACETAMINOPHEN 1000 MG: 500 TABLET ORAL at 15:38

## 2020-05-19 RX ADMIN — ROPIVACAINE HYDROCHLORIDE 200 MG: 2 INJECTION, SOLUTION EPIDURAL; INFILTRATION; PERINEURAL at 02:19

## 2020-05-19 RX ADMIN — ACETAMINOPHEN 1000 MG: 500 TABLET ORAL at 21:36

## 2020-05-19 RX ADMIN — DEXAMETHASONE SODIUM PHOSPHATE 8 MG: 4 INJECTION, SOLUTION INTRA-ARTICULAR; INTRALESIONAL; INTRAMUSCULAR; INTRAVENOUS; SOFT TISSUE at 05:53

## 2020-05-19 RX ADMIN — Medication 1000 ML: at 06:02

## 2020-05-19 RX ADMIN — SODIUM CHLORIDE, SODIUM GLUCONATE, SODIUM ACETATE, POTASSIUM CHLORIDE AND MAGNESIUM CHLORIDE: 526; 502; 368; 37; 30 INJECTION, SOLUTION INTRAVENOUS at 02:24

## 2020-05-19 RX ADMIN — METOCLOPRAMIDE 10 MG: 5 INJECTION, SOLUTION INTRAMUSCULAR; INTRAVENOUS at 05:15

## 2020-05-19 RX ADMIN — ONDANSETRON 4 MG: 2 INJECTION INTRAMUSCULAR; INTRAVENOUS at 05:53

## 2020-05-19 RX ADMIN — KETOROLAC TROMETHAMINE 30 MG: 30 INJECTION, SOLUTION INTRAMUSCULAR at 05:59

## 2020-05-19 RX ADMIN — FAMOTIDINE 20 MG: 10 INJECTION INTRAVENOUS at 05:16

## 2020-05-19 RX ADMIN — KETOROLAC TROMETHAMINE 30 MG: 30 INJECTION, SOLUTION INTRAMUSCULAR at 18:24

## 2020-05-19 RX ADMIN — SODIUM CITRATE AND CITRIC ACID MONOHYDRATE 30 ML: 500; 334 SOLUTION ORAL at 05:16

## 2020-05-19 RX ADMIN — LIDOCAINE HYDROCHLORIDE,EPINEPHRINE BITARTRATE 3 ML: 20; .005 INJECTION, SOLUTION EPIDURAL; INFILTRATION; INTRACAUDAL; PERINEURAL at 05:26

## 2020-05-19 RX ADMIN — ROPIVACAINE HYDROCHLORIDE 200 MG: 2 INJECTION, SOLUTION EPIDURAL; INFILTRATION at 02:19

## 2020-05-19 RX ADMIN — CEFAZOLIN 2 G: 330 INJECTION, POWDER, FOR SOLUTION INTRAMUSCULAR; INTRAVENOUS at 05:34

## 2020-05-19 RX ADMIN — MORPHINE SULFATE 2.5 MG: 0.5 INJECTION, SOLUTION EPIDURAL; INTRATHECAL; INTRAVENOUS at 05:54

## 2020-05-19 RX ADMIN — ROPIVACAINE HYDROCHLORIDE 10 ML: 2 INJECTION, SOLUTION EPIDURAL; INFILTRATION at 02:33

## 2020-05-19 RX ADMIN — OXYTOCIN 20 UNITS: 10 INJECTION, SOLUTION INTRAMUSCULAR; INTRAVENOUS at 05:51

## 2020-05-19 RX ADMIN — LIDOCAINE HYDROCHLORIDE,EPINEPHRINE BITARTRATE 5 ML: 15; .005 INJECTION, SOLUTION EPIDURAL; INFILTRATION; INTRACAUDAL; PERINEURAL at 02:30

## 2020-05-19 RX ADMIN — SODIUM CHLORIDE, POTASSIUM CHLORIDE, SODIUM LACTATE AND CALCIUM CHLORIDE: 600; 310; 30; 20 INJECTION, SOLUTION INTRAVENOUS at 02:00

## 2020-05-19 RX ADMIN — SODIUM CHLORIDE, SODIUM GLUCONATE, SODIUM ACETATE, POTASSIUM CHLORIDE AND MAGNESIUM CHLORIDE 1500 ML: 526; 502; 368; 37; 30 INJECTION, SOLUTION INTRAVENOUS at 05:16

## 2020-05-19 RX ADMIN — LIDOCAINE HYDROCHLORIDE,EPINEPHRINE BITARTRATE 5 ML: 20; .005 INJECTION, SOLUTION EPIDURAL; INFILTRATION; INTRACAUDAL; PERINEURAL at 05:21

## 2020-05-19 RX ADMIN — ACETAMINOPHEN 1000 MG: 500 TABLET ORAL at 09:18

## 2020-05-19 RX ADMIN — KETOROLAC TROMETHAMINE 30 MG: 30 INJECTION, SOLUTION INTRAMUSCULAR at 12:00

## 2020-05-19 ASSESSMENT — PAIN SCALES - GENERAL: PAIN_LEVEL: 0

## 2020-05-19 NOTE — ANESTHESIA PROCEDURE NOTES
Epidural Block    Date/Time: 5/19/2020 2:24 AM  Performed by: Stewart Germain M.D.  Authorized by: Stewart Germain M.D.     Patient Location:  OB  Start Time:  5/19/2020 2:24 AM  End Time:  5/19/2020 2:30 AM  Reason for Block: labor analgesia    patient identified, IV checked, site marked, risks and benefits discussed, surgical consent, monitors and equipment checked, pre-op evaluation and timeout performed    Patient Position:  Sitting  Prep: ChloraPrep, patient draped and sterile technique    Monitoring:  Blood pressure, continuous pulse oximetry and heart rate  Approach:  Midline  Location:  L3-L4  Injection Technique:  MICHELLE saline  Skin infiltration:  Lidocaine  Strength:  1%  Dose:  3ml  Needle Type:  Tuohy  Needle Gauge:  17 G  Needle Length:  3.5 in  Loss of resistance::  4.5  Catheter Size:  19 G  Catheter at Skin Depth:  10  Test Dose Result:  Negative

## 2020-05-19 NOTE — PROGRESS NOTES
EDC = 39.0 weeks gestation here for scheduled IOL for GDMA2, pt taken to triage area- covid swab complete, non-detected, pt transferred to S222 for admission. Pt states -Cont/-LOF/-VB/+FM, GBS negative

## 2020-05-19 NOTE — H&P
LABOR AND DELIVERY HISTORY AND PHYSICAL    PATIENT ID:  NAME:  Fiona Alcantar  MRN:               [id number]  YOB: 1990    CC:  IOL    HPI:  Fiona Alcantar is a 30 y.o. female  at 40w2d by a 22 week ultrasound performed on 2020 not c/w LMP on Patient's last menstrual period was 10/18/2019 (approximate).. Estimated Date of Delivery: 20  Patient presents complaining of no uterine contractions, with no loss of fluid.  decreased fetal movement.  no vaginal bleeding.  Pregnancy was complicated by inconsistent prenatal care. Seen in office today with decreased fetal movement and non-reactive NST.    ROS: Patient denies any fever chills, nausea, vomiting, headache, chest pain, shortness of breath, or dysuria or unusual swelling of hands or feet.     Prenatal Care: Obtained at [facility], 1st visit 01/15/2020 with 5 total visits.  Third trimester BPs were approximately 120s/70s.  Total weight gain 22 kg during the pregnancy.    Prenatal Labs:   HepBsAg: Neg HIV: Neg Rubella: Imm   RPR: Neg PAP: Unk GBS: Neg   GC/CT: Neg O+ / ABS Neg Quad Screen: N/A   1hr GTT: 136 3hr GTT: N/A HepC: N/A     No results for input(s): WBC, RBC, HEMOGLOBIN, HEMATOCRIT, MCV, MCH, RDW, PLATELETCT, MPV, NEUTSPOLYS, LYMPHOCYTES, MONOCYTES, EOSINOPHILS, BASOPHILS, RBCMORPHOLO in the last 72 hours.  No results for input(s): SODIUM, POTASSIUM, CHLORIDE, CO2, GLUCOSE, BUN, CPKTOTAL in the last 72 hours.       IMAGING:  OB ultrasound:   2020 8:05 AM     HISTORY/REASON FOR EXAM:  Evaluate fetal anatomy     TECHNIQUE/EXAM DESCRIPTION: OB complete ultrasound.     COMPARISON:  None     FINDINGS:  Fetal Lie:  Vertex  LMP:  10/18/2019  Clinical JANETH by LMP:  2020     Placenta (Location):  Posterior left  Placenta Previa: No  Placental Grade: I     Amniotic Fluid Volume:  DWIGHT = 19.68 cm     Fetal Heart Rate:  147 bpm     Cervical Length:  4.39 cm transabdominal     No maternal adnexal mass is  identified.     Umbilical Artery S/D Ratio(s):  Not applicable     Fetal Anatomy  (Seen or Not Seen)  Lateral Ventricles     Seen  Cisterna Magna        Seen  Cerebellum              Seen  CSP             Seen  Orbits             Seen  Face/Lips                Seen  Cord Insertion         Seen  Placental CI         Seen  4 Chamber Heart     Seen  LVOT               Seen  RVOT              Seen  3 Vessel View     Seen  Stomach       Seen  Kidneys                   Seen  Urinary Bladder      Seen  Spine                       Seen  3 Vessel Cord          Seen  Both Upper Extremities    Seen  Both Lower Extremities    Seen  Diaphragm             Seen  Movement       Seen  Gender:     Fetal Biometry  BPD    6.54 cm, 26 weeks, 3 days  HC    23.77 cm, 25 weeks, 6 days  AC    20.65 cm, 25 weeks, 2 days  Femur Length    4.29 cm, 24 weeks  Humerus Length    3.95 cm, 24 weeks, 1 day  Cerebellum Diameter   2.73 cm     EGA by this US:  25 weeks  JANETH by this US: 2020  JANETH by 1st US:  2020 by MD     Estimated Fetal Weight:  748 grams  EFW Percentile: 16.6%     Comments:  There is a nonspecific echogenic focus in the left ventricle of the fetal heart. There is an incidental left choroid plexus cyst.     IMPRESSION:     1.  Single intrauterine pregnancy of an estimated gestational age of 25 weeks with an estimated date of delivery of 2020.  2.  There is a nonspecific echogenic focus in the left ventricle of the fetal heart.  3.  There is an incidental left choroid plexus cyst.  4.  Fetal survey is otherwise within normal limits.     POB Hx:  OB History    Para Term  AB Living   3 2 2     2   SAB TAB Ectopic Molar Multiple Live Births             2      # Outcome Date GA Lbr Maged/2nd Weight Sex Delivery Anes PTL Lv   3 Current            2 Term 17 40w0d   F Vag-Spont  N IVETT   1 Term 16 40w0d   M Vag-Spont  N IVETT       PMH/Problem List:    Past Medical History:   Diagnosis Date   • Asthma       Patient Active Problem List    Diagnosis Date Noted   • Abnormal pregnancy  02/07/2020   • Rubella non-immune status, antepartum 01/16/2020   • Supervision of other normal pregnancy, antepartum 01/16/2020       Current Outpatient Medications:  No current facility-administered medications on file prior to encounter.      Current Outpatient Medications on File Prior to Encounter   Medication Sig Dispense Refill   • amoxicillin-clavulanate (AUGMENTIN) 500-125 MG Tab Take 1 Tab by mouth 2 times a day. (Patient not taking: Reported on 5/18/2020) 14 Tab 0   • Prenatal MV-Min-Fe Fum-FA-DHA (PRENATAL 1 PO) Take  by mouth.         PSH:    No past surgical history on file.    Allergies:   No Known Allergies    SH:  Social History     Socioeconomic History   • Marital status:      Spouse name: Not on file   • Number of children: Not on file   • Years of education: Not on file   • Highest education level: Not on file   Occupational History   • Not on file   Social Needs   • Financial resource strain: Not on file   • Food insecurity     Worry: Not on file     Inability: Not on file   • Transportation needs     Medical: Not on file     Non-medical: Not on file   Tobacco Use   • Smoking status: Never Smoker   • Smokeless tobacco: Never Used   Substance and Sexual Activity   • Alcohol use: Not Currently   • Drug use: Never   • Sexual activity: Yes     Partners: Male     Comment: None    Lifestyle   • Physical activity     Days per week: Not on file     Minutes per session: Not on file   • Stress: Not on file   Relationships   • Social connections     Talks on phone: Not on file     Gets together: Not on file     Attends Congregation service: Not on file     Active member of club or organization: Not on file     Attends meetings of clubs or organizations: Not on file     Relationship status: Not on file   • Intimate partner violence     Fear of current or ex partner: Not on file     Emotionally abused: Not on file      Physically abused: Not on file     Forced sexual activity: Not on file   Other Topics Concern   • Not on file   Social History Narrative   • Not on file         PHYSICAL EXAM:  Vitals:    20 1700   BP: 121/77   Pulse: 83   Temp: 36.1 °C (97 °F)     Temp (24hrs), Av.1 °C (97 °F), Min:36.1 °C (97 °F), Max:36.1 °C (97 °F)    General: No acute distress, resting comfortably in bed.  HEENT: normocephalic, nontraumatic, PERRLA, EOMI  Cardiovascular: Heart RRR with no murmurs, rubs or gallops. Distal Pulses 2+  Respiratory: symmetric chest expansion, lungs CTA bilaterally with no wheezes rales or rhonci  Abdomen: gravid, nontender  Musculoskeletal: strength 5/5 in four extremities  Neuro: non focal with no numbness, tingling or changes in sensation    SVE: 3/50%/-3  [place]: Q8 minutes;   EFM:  Baseline 145   moderate Variability   Accels to absent   Decels absent*    A/P: Intrauterine pregancy at 40w2d weeks in latent labor here for IOL for decreased fetal movement.      Patient Active Problem List    Diagnosis Date Noted   • Abnormal pregnancy US 2020   • Rubella non-immune status, antepartum 2020   • Supervision of other normal pregnancy, antepartum 2020       1. IUP at term  2. Patient is GBS Neg  3. Anticipating     Sunil Carranza M.D.

## 2020-05-19 NOTE — ANESTHESIA POSTPROCEDURE EVALUATION
Patient: Fiona Alcantar    Procedure Summary     Date:  20 Room / Location:  [facility] OR 01 / LABOR AND DELIVERY    Anesthesia Start:  224 Anesthesia Stop:  631    Procedure:   SECTION, PRIMARY (Bilateral Abdomen) Diagnosis:        delivery delivered      (same, eleno)    Surgeon:  Joaquina Flores M.D. Responsible Provider:  Stewart Germain M.D.    Anesthesia Type:  epidural ASA Status:  2 - Emergent          Final Anesthesia Type: epidural  Last vitals  BP   Blood Pressure: 119/70    Temp   36.9 °C (98.4 °F)    Pulse   Pulse: 86   Resp        SpO2          Anesthesia Post Evaluation    Patient location during evaluation: PACU  Patient participation: complete - patient participated  Level of consciousness: awake and alert  Pain score: 0    Airway patency: patent  Anesthetic complications: no  Cardiovascular status: hemodynamically stable  Respiratory status: acceptable  Hydration status: euvolemic    PONV: none

## 2020-05-19 NOTE — PROGRESS NOTES
+FM, denies LOF, VB or UC's PT presenting from [facility] for BPP due to none reactive tracing.    1650 Walker at , PT will be admitted for IOL    1704 Per Desi, PT's  in in car outside with kids, okay to go talk to him and return to floor after.    174 PT back to floor, report to Marlene.

## 2020-05-19 NOTE — OR SURGEON
Immediate Post OP Note    PreOp Diagnosis:IUP @ 40+2/7, fetal intolerance of labor    PostOp Diagnosis: Same    Procedure(s):   SECTION, PRIMARY - Wound Class: Clean Contaminated    Surgeon(s):  Joaquina Flores M.D.    Anesthesiologist/Type of Anesthesia:  Anesthesiologist: Stewart Germain M.D./Epidural    Surgical Staff:  Circulator: Marito Chavis R.N.  Scrub Person: Shante Goyal  First Assist: LORENZO Goff  L&D Baby  Nurse: Cari Galvan R.N.    Specimens removed if any:  * No specimens in log *    Estimated Blood Loss: 650 cc    Findings: viable male infant in cephalic presentation, apgars 8 and 9, weight 8 lbs 3 oz.  Normal uterus, tubes, ovaries.    Complications: None        2020 8:29 AM Joaquina Flores M.D.

## 2020-05-19 NOTE — PROGRESS NOTES
1900: Report received from Marlene Santos RN. POC discussed. IPAD  used to explain IOL process, hep B consents signed, all questions answered at this time. Pt encouraged to ask questions as they come.  1905: Pitocin started (see MAR)  0050: RN at bedside,  Ipad utilized, SVE:3-4/80/BBOW, pt ok to with AROM if needed. Pt states is doing okay pain wise and does not want intervention.  0200: Pt requesting epidural at this time. Fluid bolus initiated,  Ipad used to explain procedure. SVE: 4-5/90/-1  0210: Dr Ann called for epidural placement.  0217: Dr Ann at bedside  0305: Carlin placed, SVE: 5-6/90/-1  0320: Dr Germain called about pt BP an FHR, ok to give fluid bolus and ephedrine.  0410: pt with repetitive lates, unresolved by bolus and position changes, pt feeling pressure, SVE: 9/100/BBOW. Belia SAAVEDRA called. Will be at bedside  0421: AROM- clear fluid noted. Belia psuhing with pt and RN at bedside.  0450: Belia called to another delivery, Rn continued pushing, see flowsheet for interventions  0504: Dr Flores at bedside for FHR tracing, pt found to be c/100/-1 with little to no decent. P C/S decision made. Pre-op initiated.  0524: In OR1 for C/S  0627: In PACU 3 for recovery.  0700: Report given to Shweta WHITTINGTON RN. POC discussed.

## 2020-05-19 NOTE — PROGRESS NOTES
"S: Pt is not feeling much with UCs.  Plans for natural delivery.  Wants to wait a bit bc her  has gone home.      O:    Vitals:    05/18/20 1700 05/18/20 1802   BP: 121/77 125/75   Pulse: 83 77   Temp: 36.1 °C (97 °F) 36.9 °C (98.4 °F)   TempSrc:  Temporal   Weight: 76.2 kg (168 lb)    Height: 1.448 m (4' 9\")            FHTs:  Baseline 135, + accels, - decels, mod variability        [place]: 2 contractions in 10 minutes, mild to palpation, pitocin @ 4 milliunits        SVE: deferred    A/P:    1.  IUP @ 40w2d  2.  Cat 1 FHTs    3.  IOL at term = continue pitocin per protocol, may wait until her  returns.   4.  Pain management prn    iLndsay Oswald, CNM, APN  Dr. Flores -- attending physician          "

## 2020-05-19 NOTE — OP REPORT
DATE OF SERVICE:  2020    PREOPERATIVE DIAGNOSIS:  Intrauterine pregnancy at 40 and 2/7th weeks'   gestation with fetal intolerance of labor.    POSTOPERATIVE DIAGNOSIS:  Intrauterine pregnancy at 40 and 2/7th weeks'   gestation with fetal intolerance of labor.    PROCEDURE:  Primary low transverse  section via Pfannenstiel.    SURGEON:  Joaquina Flores MD    FIRST ASSISTANT:  SAMEER Barry, certified nurse midwife    ANESTHESIA:  Epidural.    COMPLICATIONS:  None.    ESTIMATED BLOOD LOSS:  650 mL.    FLUIDS:  1500 mL.    URINE OUTPUT:  60 mL blood-tinged urine.    INDICATIONS FOR PROCEDURE:  This is a 30-year-old  3, para 2-0-0-2, at   40-2/7th weeks' estimated gestational age, who presented for induction of   labor due to decreased fetal movement.  The patient progressed to complete   dilation and pushed for approximately 1 hour with no change in descent.    Cervical exam was complete, -1 station, in ROP presentation.  Fetal heart   tracing was in the 160s with moderate-to-minimal variability and repetitive   late decelerations.  The decision was made to proceed with primary    section.    FINDINGS:  Viable male infant in cephalic presentation with Apgars of 8 and 9,   weighing 8 pounds 3 ounces.  Normal uterus, tubes, and ovaries.    PROCEDURE IN DETAIL:  The patient was taken to the operating room where   epidural anesthesia was found to be adequate.  She was then prepped and draped   in the normal sterile fashion in the dorsal supine position with a leftward   hip tilt.  A Pfannenstiel skin incision was made with scalpel and carried down   to the underlying layer of the fascia, which was nicked in the midline and   extended laterally with the Lopez scissors.  The superior aspect of the fascial   incision was then grasped with Kocher clamps, elevated, and the underlying   rectus muscles dissected off sharply.  Attention was then turned to the   inferior aspect of this  incision, which in a similar fashion was grasped,   tented up with Kocher clamps, and the rectus muscles dissected off sharply.    The rectus muscles were  in the midline and the peritoneum was   identified, tented up, and entered sharply with Metzenbaum scissors.  The   peritoneal incision was extended superiorly and inferiorly with good   visualization of the bladder.  Bladder blade was then inserted and the   vesicouterine peritoneum identified, grasped with pickups, and entered sharply   with Metzenbaum scissors.  This incision was extended laterally and the   bladder flap was created digitally.  The bladder blade was then reinserted and   the lower uterine segment incised in a transverse fashion with the scalpel.    The uterine incision was extended laterally.  The bladder blade was removed   and the infant's head was delivered atraumatically and the remainder of the   infant was delivered without difficulty.  The cord was clamped and cut.  The   infant was handed off to the awaiting attendants.  Cord gases were sent.  The   placenta was removed manually.  The uterus was exteriorized and cleared of all   clot and debris.  The uterine incision was repaired with 1-0 chromic in a   running locked fashion.  A second layer of the same suture was used to obtain   excellent hemostasis.  The uterus was returned to the abdomen.  The gutters   were cleared of all clot and debris.  The peritoneum was closed with 3-0   Vicryl.  The fascia was reapproximated with 0 Vicryl in a running fashion.    The subcuticular fat was irrigated.  Hans's layer was closed with 2-0 Vicryl   in interrupted fashion.  The skin was closed with staples.  The patient   tolerated the procedure well.  Sponge, lap, and needle counts were correct x3.    The patient was taken to the recovery room in stable condition.       ____________________________________     Joaquina Flores MD AFC / ROSITA    DD:  05/19/2020 08:39:42  DT:  05/19/2020  10:26:02    D#:  [id number]  Job#:  [id number]

## 2020-05-19 NOTE — PROGRESS NOTES
Called to room by nurse to review fetal tracing.  Patient has been pushing for 1 hour with no descent    [place] - q 2-3 mins  EFM - 160s, moderate to minimal variability, repetitive late decelerations  Cx - C/C/-1 with pushing, ROP presentation    A/P: IUP @ 40+2/7 weeks admitted for latent labor and IOL.  Pushing for 1 hour with no descent, now with fetal intolerance of labor.  Primary C/S recommended.     Discussed with the patient indications for  delivery. The patient voiced understanding of indications for  section at this time.    Discussed with the patient the risks of  delivery. The risks include bleeding, infection, transfusion, emergency hysterectomy to control bleeding, damage to surrounding organs (bowel, bladder, ureters, nerves, vessels), need for repair or future surgery, fetal injury, unexpected pathology, anesthesia risks, and rarely death.  I also discussed with the patient the risk of wound infection, wound breakdown, and scarring. We discussed that these risks are greater in people that have diabetes or obesity.    The patient had the opportunity to ask questions regarding the procedure. All questions answered to the patient's satisfaction. Plan to proceed with urgent  delivery.

## 2020-05-19 NOTE — PROGRESS NOTES
Patient transferred from l and d  pacu to rm 315. Patient has no c/o pain.med orders released in epic.  Assessment done lochia light fundus firm . Dressing dry and intact.husbandat bedside. Patient and  oriented to room and post op and postpartum routine. Carlin patent with clear yellow urine. IV patent, without redness, swelling or drainage and second bag pitocin started on pump at 125/hr. Sequentials on and incentive spirometer at bedside, patient able to demonstrate use. Any questions answered.

## 2020-05-19 NOTE — ANESTHESIA TIME REPORT
Anesthesia Start and Stop Event Times     Date Time Event    2020 0219 Ready for Procedure     224 Anesthesia Start     631 Anesthesia Stop        Responsible Staff  20    Name Role Begin End    Stewart Germain M.D. Anesth 224        Preop Diagnosis (Free Text):  Pre-op Diagnosis     IUP 40.3 weeks gestation, failure to descend; fetal intolerance        Preop Diagnosis (Codes):  Diagnosis Information     Diagnosis Code(s):  delivery delivered [O82]        Post op Diagnosis  Pregnancy  fetal intolerance of labor, term pregnancy, labor pain    Premium Reason  A. 3PM - 7AM    Comments:

## 2020-05-19 NOTE — ANESTHESIA QCDR
2019 [facility] Clinical Data Registry (for Quality Improvement)     Postoperative nausea/vomiting risk protocol (Adult = 18 yrs and Pediatric 3-17 yrs)- (430 and 463)  General inhalation anesthetic (NOT TIVA) with PONV risk factors: No  Provision of anti-emetic therapy with at least 2 different classes of agents: N/A  Patient DID NOT receive anti-emetic therapy and reason is documented in Medical Record: N/A    Multimodal Pain Management- (477)  Non-emergent surgery AND patient age >= 18: No  Use of Multimodal Pain Management, two or more drugs and/or interventions, NOT including systemic opioids:   Exception: Documented allergy to multiple classes of analgesics:     Smoking Abstinence (404)  Patient is current smoker (cigarette, pipe, e-cig, marijuanna): No  Elective Surgery:   Abstinence instructions provided prior to day of surgery:   Patient abstained from smoking on day of surgery:     Pre-Op Beta-Blocker in Isolated CABG (44)  Isolated CABG AND patient age >= 18: No  Beta-blocker admin within 24 hours of surgical incision:   Exception:of medical reason(s) for not administering beta blocker within 24 hours prior to surgical incision (e.g., not  indicated,other medical reason):     PACU assessment of acute postoperative pain prior to Anesthesia Care End- Applies to Patients Age = 18- (ABG7)  Initial PACU pain score is which of the following: < 7/10  Patient unable to report pain score: N/A    Post-anesthetic transfer of care checklist/protocol to PACU/ICU- (426 and 427)  Upon conclusion of case, patient transferred to which of the following locations: PACU/Non-ICU  Use of transfer checklist/protocol: Yes  Exclusion: Service Performed in Patient Hospital Room (and thus did not require transfer): N/A  Unplanned admission to ICU related to anesthesia service up through end of PACU care- (MD51)  Unplanned admission to ICU (not initially anticipated at anesthesia start time): No

## 2020-05-19 NOTE — PROGRESS NOTES
1800 , 40.2 wks, 30 year old female. Admission profile completed. TOCO and US applied. IV initiated.  used to ask questions. VSS.      SVE 3/tthick/ -3.    [address] EASTMAN notified. Orders received from pitocin.      Report given to Marito CRUZ.

## 2020-05-19 NOTE — ANESTHESIA PREPROCEDURE EVALUATION
Relevant Problems   No relevant active problems   pregnancy, labor pain    Physical Exam    Airway   Mallampati: II  TM distance: >3 FB  Neck ROM: full       Cardiovascular - normal exam  Rhythm: regular  Rate: normal  (-) murmur     Dental - normal exam           Pulmonary - normal exam  Breath sounds clear to auscultation     Abdominal    Neurological - normal exam                 Anesthesia Plan    ASA 3 (fetal intolerance of labor)- EMERGENT   ASA physical status emergent criteria: non-reassuring fetal heart tones    Plan - epidural   Neuraxial block will be labor analgesia              Pertinent diagnostic labs and testing reviewed    Informed Consent:    Anesthetic plan and risks discussed with patient.

## 2020-05-19 NOTE — CARE PLAN
Problem: Infection  Goal: Will remain free from infection  Intervention: Assess signs and symptoms of infection  Note: Pt remains afebrile, no other s/s of infection noted. Will continue to monitor.     Problem: Knowledge Deficit  Goal: Knowledge of disease process/condition, treatment plan, diagnostic tests, and medications will improve  Intervention: Assess knowledge level of disease process/condition, treatment plan, diagnostic tests, and medications  Note: Pt educated on labor and IOL process. All questions answered at this time. Will continue to monitor.

## 2020-05-20 LAB
ERYTHROCYTE [DISTWIDTH] IN BLOOD BY AUTOMATED COUNT: 51.8 FL (ref 35.9–50)
HCT VFR BLD AUTO: 22.7 % (ref 37–47)
HGB BLD-MCNC: 7.2 G/DL (ref 12–16)
MCH RBC QN AUTO: 30.1 PG (ref 27–33)
MCHC RBC AUTO-ENTMCNC: 31.7 G/DL (ref 33.6–35)
MCV RBC AUTO: 95 FL (ref 81.4–97.8)
PLATELET # BLD AUTO: 203 K/UL (ref 164–446)
PMV BLD AUTO: 11.5 FL (ref 9–12.9)
RBC # BLD AUTO: 2.39 M/UL (ref 4.2–5.4)
WBC # BLD AUTO: 12.4 K/UL (ref 4.8–10.8)

## 2020-05-20 PROCEDURE — 90707 MMR VACCINE SC: CPT | Performed by: OBSTETRICS & GYNECOLOGY

## 2020-05-20 PROCEDURE — 90471 IMMUNIZATION ADMIN: CPT

## 2020-05-20 PROCEDURE — 700111 HCHG RX REV CODE 636 W/ 250 OVERRIDE (IP): Performed by: ANESTHESIOLOGY

## 2020-05-20 PROCEDURE — 700112 HCHG RX REV CODE 229: Performed by: STUDENT IN AN ORGANIZED HEALTH CARE EDUCATION/TRAINING PROGRAM

## 2020-05-20 PROCEDURE — 700102 HCHG RX REV CODE 250 W/ 637 OVERRIDE(OP): Performed by: STUDENT IN AN ORGANIZED HEALTH CARE EDUCATION/TRAINING PROGRAM

## 2020-05-20 PROCEDURE — 770002 HCHG ROOM/CARE - OB PRIVATE (112)

## 2020-05-20 PROCEDURE — 3E0234Z INTRODUCTION OF SERUM, TOXOID AND VACCINE INTO MUSCLE, PERCUTANEOUS APPROACH: ICD-10-PCS | Performed by: OBSTETRICS & GYNECOLOGY

## 2020-05-20 PROCEDURE — 85027 COMPLETE CBC AUTOMATED: CPT

## 2020-05-20 PROCEDURE — A9270 NON-COVERED ITEM OR SERVICE: HCPCS | Performed by: ANESTHESIOLOGY

## 2020-05-20 PROCEDURE — 700111 HCHG RX REV CODE 636 W/ 250 OVERRIDE (IP): Performed by: OBSTETRICS & GYNECOLOGY

## 2020-05-20 PROCEDURE — 700102 HCHG RX REV CODE 250 W/ 637 OVERRIDE(OP): Performed by: ANESTHESIOLOGY

## 2020-05-20 PROCEDURE — A9270 NON-COVERED ITEM OR SERVICE: HCPCS | Performed by: STUDENT IN AN ORGANIZED HEALTH CARE EDUCATION/TRAINING PROGRAM

## 2020-05-20 PROCEDURE — 36415 COLL VENOUS BLD VENIPUNCTURE: CPT

## 2020-05-20 PROCEDURE — 700105 HCHG RX REV CODE 258: Performed by: OBSTETRICS & GYNECOLOGY

## 2020-05-20 RX ORDER — FERROUS SULFATE 325(65) MG
325 TABLET ORAL
Status: DISCONTINUED | OUTPATIENT
Start: 2020-05-20 | End: 2020-05-20

## 2020-05-20 RX ORDER — DOCUSATE SODIUM 100 MG/1
100 CAPSULE, LIQUID FILLED ORAL 2 TIMES DAILY
Status: DISCONTINUED | OUTPATIENT
Start: 2020-05-20 | End: 2020-05-22 | Stop reason: HOSPADM

## 2020-05-20 RX ORDER — IBUPROFEN 600 MG/1
600 TABLET ORAL EVERY 6 HOURS
Status: DISCONTINUED | OUTPATIENT
Start: 2020-05-20 | End: 2020-05-22 | Stop reason: HOSPADM

## 2020-05-20 RX ORDER — ACETAMINOPHEN 500 MG
1000 TABLET ORAL EVERY 6 HOURS
Status: DISCONTINUED | OUTPATIENT
Start: 2020-05-20 | End: 2020-05-22 | Stop reason: HOSPADM

## 2020-05-20 RX ORDER — FERROUS SULFATE 325(65) MG
325 TABLET ORAL 2 TIMES DAILY WITH MEALS
Status: DISCONTINUED | OUTPATIENT
Start: 2020-05-20 | End: 2020-05-21

## 2020-05-20 RX ADMIN — IBUPROFEN 600 MG: 600 TABLET ORAL at 09:02

## 2020-05-20 RX ADMIN — IBUPROFEN 600 MG: 600 TABLET ORAL at 16:23

## 2020-05-20 RX ADMIN — KETOROLAC TROMETHAMINE 30 MG: 30 INJECTION, SOLUTION INTRAMUSCULAR at 05:55

## 2020-05-20 RX ADMIN — FERROUS SULFATE TAB 325 MG (65 MG ELEMENTAL FE) 325 MG: 325 (65 FE) TAB at 09:02

## 2020-05-20 RX ADMIN — ACETAMINOPHEN 1000 MG: 500 TABLET ORAL at 16:22

## 2020-05-20 RX ADMIN — AZITHROMYCIN MONOHYDRATE 500 MG: 500 INJECTION, POWDER, LYOPHILIZED, FOR SOLUTION INTRAVENOUS at 06:00

## 2020-05-20 RX ADMIN — ACETAMINOPHEN 1000 MG: 500 TABLET ORAL at 09:03

## 2020-05-20 RX ADMIN — KETOROLAC TROMETHAMINE 30 MG: 30 INJECTION, SOLUTION INTRAMUSCULAR at 00:33

## 2020-05-20 RX ADMIN — DOCUSATE SODIUM 100 MG: 100 CAPSULE, LIQUID FILLED ORAL at 18:00

## 2020-05-20 RX ADMIN — DOCUSATE SODIUM 100 MG: 100 CAPSULE, LIQUID FILLED ORAL at 09:04

## 2020-05-20 RX ADMIN — ACETAMINOPHEN 1000 MG: 500 TABLET ORAL at 21:07

## 2020-05-20 RX ADMIN — ACETAMINOPHEN 1000 MG: 500 TABLET ORAL at 03:34

## 2020-05-20 RX ADMIN — MEASLES, MUMPS, AND RUBELLA VIRUS VACCINE LIVE 0.5 ML: 1000; 12500; 1000 INJECTION, POWDER, LYOPHILIZED, FOR SUSPENSION SUBCUTANEOUS at 02:40

## 2020-05-20 RX ADMIN — IBUPROFEN 600 MG: 600 TABLET ORAL at 21:07

## 2020-05-20 RX ADMIN — FERROUS SULFATE TAB 325 MG (65 MG ELEMENTAL FE) 325 MG: 325 (65 FE) TAB at 17:30

## 2020-05-20 ASSESSMENT — EDINBURGH POSTNATAL DEPRESSION SCALE (EPDS)
THE THOUGHT OF HARMING MYSELF HAS OCCURRED TO ME: NEVER
I HAVE FELT SAD OR MISERABLE: NO, NOT AT ALL
I HAVE BEEN SO UNHAPPY THAT I HAVE BEEN CRYING: NO, NEVER
I HAVE BEEN SO UNHAPPY THAT I HAVE HAD DIFFICULTY SLEEPING: NOT AT ALL
I HAVE BLAMED MYSELF UNNECESSARILY WHEN THINGS WENT WRONG: NO, NEVER
I HAVE FELT SCARED OR PANICKY FOR NO GOOD REASON: NO, NOT AT ALL
THINGS HAVE BEEN GETTING ON TOP OF ME: NO, MOST OF THE TIME I HAVE COPED QUITE WELL
I HAVE BEEN ANXIOUS OR WORRIED FOR NO GOOD REASON: NO, NOT AT ALL
I HAVE BEEN ABLE TO LAUGH AND SEE THE FUNNY SIDE OF THINGS: AS MUCH AS I ALWAYS COULD
I HAVE LOOKED FORWARD WITH ENJOYMENT TO THINGS: AS MUCH AS I EVER DID

## 2020-05-20 NOTE — CARE PLAN
Problem: Altered physiologic condition related to postoperative  delivery  Goal: Patient physiologically stable as evidenced by normal lochia, palpable uterine involution and vital signs within normal limits  Description: VSS. Fundus firm with light lochia. Patient educated on when to pull emergency call light.   Outcome: PROGRESSING AS EXPECTED     Problem: Potential for postpartum infection related to surgical incision, compromised uterine condition, urinary tract or respiratory compromise  Goal: Patient will be afebrile and free from signs and symptoms of infection  Description: Patient remains afebrile. No s/sx of infection at this time. Pressure dressing in place, CDI.   Outcome: PROGRESSING AS EXPECTED     Problem: Alteration in comfort related to surgical incision and/or after birth pains  Goal: Patient verbalizes acceptable pain level  Outcome: PROGRESSING AS EXPECTED     Problem: Potential knowledge deficit related to lack of understanding of self and  care  Goal: Patient will verbalize understanding of self and infant care  Outcome: PROGRESSING AS EXPECTED

## 2020-05-20 NOTE — PROGRESS NOTES
Name:   Fiona Alcantar   Date/Time:  2020 6:14 AM  Gestational Age:  40w3d  Admit Date:   2020  Admitting Dx:   Pregnancy  Supervision of high risk pregnancy in third trimester    POD# 1 S/P primary c/section for fetal intolerance to labor     services were used in the patient's primary language of Divehi.     Name or Number: [id number]  Mode of interpretation: iPad    Content of Interpretation:      S:  Abdominal pain no   Ambulating   yes  Tolerating PO  yes  Flatus    yes  Bleeding   no  Voiding   yes   Dizziness   no  Breast feeding  no  Breast tenderness  N\A    O:  Pulse: 75  Blood Pressure: 102/57     Temp  Av.7 °C (98.1 °F)  Min: 36.3 °C (97.4 °F)  Max: 37.1 °C (98.8 °F)  Heart: regular rate and rhythm without gallops or murmurs  Lungs: clear bases  Abdomen: flat and soft/nontender / bowelsounds present / incision clean and dry.  Extremities: non-tender  Catheter: DC'd    Intake/Output Summary (Last 24 hours) at 2020 0614  Last data filed at 2020 1755  Gross per 24 hour   Intake 1750 ml   Output 1450 ml   Net 300 ml       A:    at 40w2d by 22wk u/s admitted for IOL after non-reactive NST in office found to be in latent labor    POD# 1 S/P primary c/section after pushing for 1 hour with no fetal descent and fetal intolerance to labor    EBL ~650, ROM 1.5hr s/p Azithromycin >24hr  Stable/progressing well, meeting post op milestones  Hgb 7.2, asymptomatic, on PO Iron    P:  Routine C/S Postpartum care, continue pain management, encourage ambulation, anticipate DC POD#2/3    Sandra Levy M.D.

## 2020-05-20 NOTE — CARE PLAN
Problem: Altered physiologic condition related to postoperative  delivery  Goal: Patient physiologically stable as evidenced by normal lochia, palpable uterine involution and vital signs within normal limits  Description: VSS. Fundus firm with light lochia. Patient educated on when to pull emergency call light.   Outcome: PROGRESSING AS EXPECTED  Note: VSS. Fundus firm with light lochia. Patient educated on when to pull emergency call light.      Problem: Potential for postpartum infection related to surgical incision, compromised uterine condition, urinary tract or respiratory compromise  Goal: Patient will be afebrile and free from signs and symptoms of infection  Description: Patient remains afebrile. No s/sx of infection at this time. Pressure dressing in place, CDI.   Outcome: PROGRESSING AS EXPECTED  Note: Patient remains afebrile. No s/sx of infection at this time. Pressure dressing CDI.

## 2020-05-20 NOTE — PROGRESS NOTES
Patient assisted up to bathroom with standby assist. Patient stated dizzy at first. Color good and gait steady. Patient given option to d/c catheter now or in am and patient chose now.Carlin d/c'd, leslye-care done and then patient brushed teeth and washed hands, then ambulated back to bed.

## 2020-05-20 NOTE — PROGRESS NOTES
Received bedside report from NANCY Sellers. Patient in room on pulse oximeter. Patient declines pain at this time. Whiteboards updated, POC discussed. Call light within reach. Patient encouraged to call with any needs and or concerns.

## 2020-05-20 NOTE — CARE PLAN
Problem: Altered physiologic condition related to postoperative  delivery  Goal: Patient physiologically stable as evidenced by normal lochia, palpable uterine involution and vital signs within normal limits  Description: VSS. Fundus firm with light lochia. Patient educated on when to pull emergency call light.   Outcome: PROGRESSING AS EXPECTED     Problem: Potential for postpartum infection related to surgical incision, compromised uterine condition, urinary tract or respiratory compromise  Goal: Patient will be afebrile and free from signs and symptoms of infection  Description: Patient remains afebrile. No s/sx of infection at this time. Pressure dressing in place, CDI.   Outcome: PROGRESSING AS EXPECTED     Problem: Alteration in comfort related to surgical incision and/or after birth pains  Goal: Patient is able to ambulate, care for self and infant with acceptable pain level  Outcome: PROGRESSING AS EXPECTED  Goal: Patient verbalizes acceptable pain level  2020 1340 by Shweta Crespo R.BINU.  Outcome: PROGRESSING AS EXPECTED  2020 1339 by Shweta Crespo R.N.  Outcome: PROGRESSING AS EXPECTED     Problem: Potential knowledge deficit related to lack of understanding of self and  care  Goal: Patient will verbalize understanding of self and infant care  Outcome: PROGRESSING AS EXPECTED  Goal: Patient will demonstrate ability to care for self and infant  Outcome: PROGRESSING AS EXPECTED     Problem: Potential anxiety related to difficulty adapting to parental role  Goal: Patient will verbalize and demonstrate effective bonding and parenting behavior  Outcome: PROGRESSING AS EXPECTED

## 2020-05-20 NOTE — PROGRESS NOTES
0930: Pt assessment complete, fundus firm lochia rubra light.Checked ID Bands for safety. Pain management and plan of care discussed with patient. Mother and FOB bonding with baby. Encouraged patient to use call light. Yesika CRUZ  present during assessment.

## 2020-05-21 PROCEDURE — 3E0234Z INTRODUCTION OF SERUM, TOXOID AND VACCINE INTO MUSCLE, PERCUTANEOUS APPROACH: ICD-10-PCS | Performed by: OBSTETRICS & GYNECOLOGY

## 2020-05-21 PROCEDURE — A9270 NON-COVERED ITEM OR SERVICE: HCPCS | Performed by: STUDENT IN AN ORGANIZED HEALTH CARE EDUCATION/TRAINING PROGRAM

## 2020-05-21 PROCEDURE — 700112 HCHG RX REV CODE 229: Performed by: STUDENT IN AN ORGANIZED HEALTH CARE EDUCATION/TRAINING PROGRAM

## 2020-05-21 PROCEDURE — 770002 HCHG ROOM/CARE - OB PRIVATE (112)

## 2020-05-21 PROCEDURE — 700111 HCHG RX REV CODE 636 W/ 250 OVERRIDE (IP)

## 2020-05-21 PROCEDURE — 700102 HCHG RX REV CODE 250 W/ 637 OVERRIDE(OP): Performed by: STUDENT IN AN ORGANIZED HEALTH CARE EDUCATION/TRAINING PROGRAM

## 2020-05-21 PROCEDURE — 90471 IMMUNIZATION ADMIN: CPT

## 2020-05-21 PROCEDURE — 90715 TDAP VACCINE 7 YRS/> IM: CPT

## 2020-05-21 RX ORDER — FERROUS SULFATE 325(65) MG
325 TABLET ORAL
Status: DISCONTINUED | OUTPATIENT
Start: 2020-05-21 | End: 2020-05-22 | Stop reason: HOSPADM

## 2020-05-21 RX ORDER — OXYCODONE HYDROCHLORIDE 5 MG/1
5 TABLET ORAL EVERY 4 HOURS PRN
Status: DISCONTINUED | OUTPATIENT
Start: 2020-05-21 | End: 2020-05-22 | Stop reason: HOSPADM

## 2020-05-21 RX ADMIN — TETANUS TOXOID, REDUCED DIPHTHERIA TOXOID AND ACELLULAR PERTUSSIS VACCINE, ADSORBED 0.5 ML: 5; 2.5; 8; 8; 2.5 SUSPENSION INTRAMUSCULAR at 15:48

## 2020-05-21 RX ADMIN — IBUPROFEN 600 MG: 600 TABLET ORAL at 20:38

## 2020-05-21 RX ADMIN — IBUPROFEN 600 MG: 600 TABLET ORAL at 03:22

## 2020-05-21 RX ADMIN — IBUPROFEN 600 MG: 600 TABLET ORAL at 09:46

## 2020-05-21 RX ADMIN — FERROUS SULFATE TAB 325 MG (65 MG ELEMENTAL FE) 325 MG: 325 (65 FE) TAB at 09:43

## 2020-05-21 RX ADMIN — DOCUSATE SODIUM 100 MG: 100 CAPSULE, LIQUID FILLED ORAL at 06:35

## 2020-05-21 RX ADMIN — ACETAMINOPHEN 1000 MG: 500 TABLET ORAL at 09:44

## 2020-05-21 RX ADMIN — IBUPROFEN 600 MG: 600 TABLET ORAL at 15:45

## 2020-05-21 RX ADMIN — ACETAMINOPHEN 1000 MG: 500 TABLET ORAL at 20:38

## 2020-05-21 RX ADMIN — DOCUSATE SODIUM 100 MG: 100 CAPSULE, LIQUID FILLED ORAL at 17:38

## 2020-05-21 RX ADMIN — FERROUS SULFATE TAB 325 MG (65 MG ELEMENTAL FE) 325 MG: 325 (65 FE) TAB at 17:38

## 2020-05-21 RX ADMIN — ACETAMINOPHEN 1000 MG: 500 TABLET ORAL at 03:22

## 2020-05-21 RX ADMIN — ACETAMINOPHEN 1000 MG: 500 TABLET ORAL at 15:44

## 2020-05-21 RX ADMIN — FERROUS SULFATE TAB 325 MG (65 MG ELEMENTAL FE) 325 MG: 325 (65 FE) TAB at 12:15

## 2020-05-21 NOTE — CARE PLAN
Problem: Medication  Goal: Compliance with prescribed medication will improve  2020 1426 by Shweta Crespo R.N.  Outcome: PROGRESSING AS EXPECTED  2020 133 by Shweta Crespo R.N.  Outcome: PROGRESSING AS EXPECTED  2020 133 by Shweta Crespo R.N.  Outcome: PROGRESSING AS EXPECTED     Problem: Communication  Goal: The ability to communicate needs accurately and effectively will improve  2020 1426 by Shweta Crespo R.N.  Outcome: PROGRESSING AS EXPECTED  2020 133 by Shweta Crespo R.N.  Outcome: PROGRESSING AS EXPECTED  2020 133 by Shweta Crespo R.N.  Outcome: PROGRESSING AS EXPECTED     Problem: Safety  Goal: Will remain free from injury  2020 by Shweta Crespo R.N.  Outcome: PROGRESSING AS EXPECTED  2020 by Shweta Crespo R.N.  Outcome: PROGRESSING AS EXPECTED     Problem: Altered physiologic condition related to postoperative  delivery  Goal: Patient physiologically stable as evidenced by normal lochia, palpable uterine involution and vital signs within normal limits  Description: VSS. Fundus firm with light lochia. Patient educated on when to pull emergency call light.   Outcome: PROGRESSING AS EXPECTED     Problem: Potential anxiety related to difficulty adapting to parental role  Goal: Patient will verbalize and demonstrate effective bonding and parenting behavior  Outcome: PROGRESSING AS EXPECTED

## 2020-05-21 NOTE — PROGRESS NOTES
Pt assessment complete, fundus firm lochia rubra light.Checked ID Bands for safety. Pain management and plan of care discussed with patient. Mother and FOB bonding with baby, seen being held. Encouraged patient to use call light. Yesika CRUZ present to interpret.

## 2020-05-21 NOTE — CARE PLAN
Problem: Medication  Goal: Compliance with prescribed medication will improve  5/21/2020 1337 by Shweta Crespo R.N.  Outcome: PROGRESSING AS EXPECTED  5/21/2020 1337 by Shweta Crespo R.N.  Outcome: PROGRESSING AS EXPECTED     Problem: Communication  Goal: The ability to communicate needs accurately and effectively will improve  5/21/2020 1337 by Shweta Crespo R.N.  Outcome: PROGRESSING AS EXPECTED  5/21/2020 1337 by Shweta Crespo R.N.  Outcome: PROGRESSING AS EXPECTED     Problem: Safety  Goal: Will remain free from injury  5/21/2020 1337 by Shweta Crespo R.N.  Outcome: PROGRESSING AS EXPECTED  5/21/2020 1337 by Shweta Crespo R.N.  Outcome: PROGRESSING AS EXPECTED

## 2020-05-21 NOTE — PROGRESS NOTES
Assumed care of patient. Assessment complete. Fundus is firm, with scant lochia rubra. Pain level 5/10, pt has medications due at 2100. Pt states tolerable and will wait till medications can be pulled. Bed is locked and in low position. Call light left within reach and encouraged to call for any needs if necessary.

## 2020-05-21 NOTE — CARE PLAN
Problem: Altered physiologic condition related to postoperative  delivery  Goal: Patient physiologically stable as evidenced by normal lochia, palpable uterine involution and vital signs within normal limits  Description: VSS. Fundus firm with light lochia. Patient educated on when to pull emergency call light.   Outcome: PROGRESSING AS EXPECTED  Note: Fundus firm, lochia is light to scant and rubra. No clots.      Problem: Alteration in comfort related to surgical incision and/or after birth pains  Goal: Patient is able to ambulate, care for self and infant with acceptable pain level  Outcome: PROGRESSING AS EXPECTED  Note: Pt ambulating without difficulty, ambulation in room encouraged. Involved in infant cares.

## 2020-05-21 NOTE — PROGRESS NOTES
POSTPARTUM PROGRESS NOTE    PATIENT ID:  NAME:  Fiona Alcantar  MRN:               [id number]  YOB: 1990     30 y.o. female admitted at 40w3d now   POD#2 s/p primary c/section for fetal intolerance to labor     services were used in the patient's primary language of Frisian.     Name or Number: [id number]  Mode of interpretation: iPad    Content of Interpretation:    Subjective:   Abdominal pain: denies  Ambulating: well  tolerating liquids: yes  tolerating regular diet: yes  Flatus: yes  BM: yes  Bleeding: light  Voiding: well  Dizziness: denies  Breast feeding: no  Breast tenderness: N/A    Objective:    Vitals:    20 0200 20 0600 20 1800 20 0600   BP: 102/57 (!) 99/56 112/70 110/66   Pulse: 75 75 75 72   Resp:    Temp: 37.1 °C (98.7 °F) 36.7 °C (98.1 °F) 36.9 °C (98.5 °F) 36.7 °C (98 °F)   TempSrc: Temporal Temporal Temporal Temporal   SpO2: 96% 95% 96% 95%   Weight:       Height:         General: No acute distress, resting comfortably in bed.  HEENT: normocephalic, nontraumatic, EOMI  Cardiovascular: Heart RRR with no murmurs, rubs or gallops. Distal Pulses 2+  Respiratory: symmetric chest expansion, lungs CTA bilaterally with no wheezes rales or rhonci  Abdomen: soft, mildly tender around incision which is clean, dry and intact, fundus firm, +BS  Genitourinary: lochia light, denies excessive vaginal bleeding  Musculoskeletal: strength 5/5 in four extremities, no calf tenderness  Neuro: no focal deficits noted      Recent Labs     20  1825 20  0500   WBC 9.9 12.4*   RBC 3.49* 2.39*   HEMOGLOBIN 10.5* 7.2*   HEMATOCRIT 32.6* 22.7*   MCV 93.4 95.0   MCH 30.1 30.1   RDW 50.2* 51.8*   PLATELETCT 221 203   MPV 11.8 11.5   NEUTSPOLYS 69.10  --    LYMPHOCYTES 20.40*  --    MONOCYTES 8.60  --    EOSINOPHILS 0.70  --    BASOPHILS 0.20  --      No results for input(s): SODIUM, POTASSIUM, CHLORIDE, CO2, GLUCOSE, BUN, CPKTOTAL  in the last 72 hours.    Current Meds:   Current Facility-Administered Medications   Medication Dose Frequency Provider Last Rate Last Dose   • ferrous sulfate tablet 325 mg  325 mg TID WITH MEALS Sandra Levy M.D.       • oxyCODONE immediate-release (ROXICODONE) tablet 5 mg  5 mg Q4HRS PRN Sandra Levy M.D.       • acetaminophen (TYLENOL) tablet 1,000 mg  1,000 mg Q6HRS Sandra Levy M.D.   1,000 mg at 20 0322   • ibuprofen (MOTRIN) tablet 600 mg  600 mg Q6HRS Sandra Levy M.D.   600 mg at 20 0322   • docusate sodium (COLACE) capsule 100 mg  100 mg BID Sandra Levy M.D.   100 mg at 20 0635   • oxytocin (PITOCIN) infusion (for postpartum)   mL/hr Continuous Sunil Carranza M.D.       • oxytocin (PITOCIN) 20 UNITS/1000ML LR (induction of labor)  0.5-20 andree-units/min Continuous Sunil Carranza M.D. 24 mL/hr at 20 2235 1,000 mL at 20 0602   Last reviewed on 2020  2:52 PM by Armin Hernandez M.D.          Assessment:  30 y.o. female  at 40w3d POD#2 s/p primary c/section due to fetal intolerance to labor    EBL ~650, ROM 1.5hr s/p Azithromycin >24hr  Stable/progressing well, meeting post op milestones  Hgb 7.2, continues to be asymptomatic, on PO Iron, tolerated BID, will up to TID, encouraged hydration with scheduled stool softener    Plan:   Routine C/S Postpartum care, continue pain management, encourage ambulation, anticipate DC POD#3 after staple removal        Sandra Levy M.D.

## 2020-05-22 VITALS
WEIGHT: 168 LBS | OXYGEN SATURATION: 97 % | DIASTOLIC BLOOD PRESSURE: 67 MMHG | HEIGHT: 57 IN | SYSTOLIC BLOOD PRESSURE: 106 MMHG | BODY MASS INDEX: 36.24 KG/M2 | TEMPERATURE: 98.6 F | RESPIRATION RATE: 18 BRPM | HEART RATE: 89 BPM

## 2020-05-22 PROCEDURE — 700112 HCHG RX REV CODE 229: Performed by: STUDENT IN AN ORGANIZED HEALTH CARE EDUCATION/TRAINING PROGRAM

## 2020-05-22 PROCEDURE — 700102 HCHG RX REV CODE 250 W/ 637 OVERRIDE(OP): Performed by: STUDENT IN AN ORGANIZED HEALTH CARE EDUCATION/TRAINING PROGRAM

## 2020-05-22 PROCEDURE — A9270 NON-COVERED ITEM OR SERVICE: HCPCS | Performed by: STUDENT IN AN ORGANIZED HEALTH CARE EDUCATION/TRAINING PROGRAM

## 2020-05-22 RX ORDER — FERROUS SULFATE 325(65) MG
325 TABLET ORAL
Qty: 90 TAB | Refills: 2 | Status: SHIPPED | OUTPATIENT
Start: 2020-05-22 | End: 2022-11-30

## 2020-05-22 RX ORDER — PSEUDOEPHEDRINE HCL 30 MG
100 TABLET ORAL 2 TIMES DAILY
Qty: 60 CAP | Refills: 1 | Status: SHIPPED | OUTPATIENT
Start: 2020-05-22 | End: 2022-11-30

## 2020-05-22 RX ORDER — ACETAMINOPHEN 500 MG
1000 TABLET ORAL EVERY 6 HOURS
Qty: 30 TAB | Refills: 0 | COMMUNITY
Start: 2020-05-22 | End: 2021-05-19

## 2020-05-22 RX ORDER — IBUPROFEN 800 MG/1
800 TABLET ORAL EVERY 8 HOURS PRN
Qty: 60 TAB | Refills: 1 | Status: SHIPPED | OUTPATIENT
Start: 2020-05-22 | End: 2022-11-30

## 2020-05-22 RX ORDER — OXYCODONE HYDROCHLORIDE 5 MG/1
5 TABLET ORAL EVERY 6 HOURS PRN
Qty: 20 TAB | Refills: 0 | Status: SHIPPED | OUTPATIENT
Start: 2020-05-22 | End: 2020-05-27

## 2020-05-22 RX ADMIN — FERROUS SULFATE TAB 325 MG (65 MG ELEMENTAL FE) 325 MG: 325 (65 FE) TAB at 11:41

## 2020-05-22 RX ADMIN — IBUPROFEN 600 MG: 600 TABLET ORAL at 02:48

## 2020-05-22 RX ADMIN — FERROUS SULFATE TAB 325 MG (65 MG ELEMENTAL FE) 325 MG: 325 (65 FE) TAB at 08:10

## 2020-05-22 RX ADMIN — DOCUSATE SODIUM 100 MG: 100 CAPSULE, LIQUID FILLED ORAL at 05:33

## 2020-05-22 RX ADMIN — ACETAMINOPHEN 1000 MG: 500 TABLET ORAL at 11:38

## 2020-05-22 RX ADMIN — ACETAMINOPHEN 1000 MG: 500 TABLET ORAL at 02:48

## 2020-05-22 RX ADMIN — IBUPROFEN 600 MG: 600 TABLET ORAL at 08:10

## 2020-05-22 NOTE — DISCHARGE SUMMARY
Discharge Summary:      Fiona Alcantar      Admit Date:   2020  Discharge Date:  2020     Admitting diagnosis:  Pregnancy  Supervision of high risk pregnancy in third trimester  Discharge Diagnosis: Status post primary c/section due to fetal intolerance of labor  Pregnancy Complications: Rh negative  Tubal Ligation:  no        History:  Past Medical History:   Diagnosis Date   • Asthma      OB History    Para Term  AB Living   3 3 3     3   SAB TAB Ectopic Molar Multiple Live Births           0 3      # Outcome Date GA Lbr Maged/2nd Weight Sex Delivery Anes PTL Lv   3 Term 20 40w3d / 01:29 3.71 kg (8 lb 2.9 oz) M CS-LTranv EPI N IVETT   2 Term 17 40w0d   F Vag-Spont  N IVETT   1 Term 16 40w0d   M Vag-Spont  N IVETT        Patient has no known allergies.  Patient Active Problem List    Diagnosis Date Noted   • Abnormal pregnancy US 2020   • Rubella non-immune status, antepartum 2020   • Supervision of other normal pregnancy, antepartum 2020        Hospital Course:   30 y.o. now , was admitted with the above mentioned diagnosis, underwent primary c/section due to fetal intolerance of labor, patient was complete and pushing for 1 hour without fetal descent, 's moderate-minimal variability and repetative late decelerations, was ROP. Patient postpartum course was unremarkable with exception of low hgb, 7.1, no tachycardia syncope or near syncope. Patient tolerated BID Ferrous sulfate which was increased to TID with stool softener. Had progressive advancement in diet, ambulation and toleration of oral analgesia. Patient without complaints today and desires discharge.      Interview assisted by NANCY Shaw  Abdominal pain: denies  Ambulating: well  tolerating liquids: yes  tolerating regular diet: yes  Flatus: endorses  BM: yes  Bleeding: denies  Voiding: well  Dizziness: denies  Breast feeding: No  Breast tenderness: N/A    Vitals:     05/20/20 1800 05/21/20 0600 05/21/20 1800 05/22/20 0545   BP: 112/70 110/66 116/69 106/67   Pulse: 75 72 80 89   Resp: 20 20 16 18   Temp: 36.9 °C (98.5 °F) 36.7 °C (98 °F) 37.1 °C (98.7 °F) 37 °C (98.6 °F)   TempSrc: Temporal Temporal Temporal Temporal   SpO2: 96% 95% 99% 97%   Weight:       Height:           Current Facility-Administered Medications   Medication Dose   • ferrous sulfate tablet 325 mg  325 mg   • oxyCODONE immediate-release (ROXICODONE) tablet 5 mg  5 mg   • acetaminophen (TYLENOL) tablet 1,000 mg  1,000 mg   • ibuprofen (MOTRIN) tablet 600 mg  600 mg   • docusate sodium (COLACE) capsule 100 mg  100 mg   • oxytocin (PITOCIN) infusion (for postpartum)   mL/hr   • oxytocin (PITOCIN) 20 UNITS/1000ML LR (induction of labor)  0.5-20 andree-units/min       Exam:  Vitals:    05/20/20 1800 05/21/20 0600 05/21/20 1800 05/22/20 0545   BP: 112/70 110/66 116/69 106/67   Pulse: 75 72 80 89   Resp: 20 20 16 18   Temp: 36.9 °C (98.5 °F) 36.7 °C (98 °F) 37.1 °C (98.7 °F) 37 °C (98.6 °F)   TempSrc: Temporal Temporal Temporal Temporal   SpO2: 96% 95% 99% 97%   Weight:       Height:         General: No acute distress, resting comfortably in bed.  HEENT: normocephalic, nontraumatic, EOMI  Cardiovascular: Heart RRR with no murmurs, rubs or gallops. Distal Pulses 2+  Respiratory: symmetric chest expansion, lungs CTA bilaterally with no wheezes rales or rhonci  Abdomen: soft, mildly tender around incision which is clean, dry and intact, fundus firm, +BS  Genitourinary: lochia light, denies excessive vaginal bleeding  Musculoskeletal: strength 5/5 in four extremities, no calf tenderness  Neuro: no focal deficits noted       Labs:  Recent Labs     05/20/20  0500   WBC 12.4*   RBC 2.39*   HEMOGLOBIN 7.2*   HEMATOCRIT 22.7*   MCV 95.0   MCH 30.1   MCHC 31.7*   RDW 51.8*   PLATELETCT 203   MPV 11.5        Activity:   Discharge to home  Pelvic Rest x 6 weeks, patient not interested in contraceptive methods at this  time    Assessment:  normal postpartum course     Follow up: .  [facility] or [facility] in 5 weeks for post partum follow up; 1 week for incision check.   To resume daily PNV and iron supplement if needed with hydration.     Patient to return to [facility] or ER if any of the following occur:   Fever over 100.5   Severe abdominal pain   Red streaks or painful masses in the breasts   Foul smelling discharge or lochia   Heavy vaginal bleeding saturating a pad per hour   S/s of PP depression     Discharge Meds:      Medication List      START taking these medications      Instructions   acetaminophen 500 MG Tabs  Commonly known as:  TYLENOL   Take 2 Tabs by mouth every 6 hours.  Dose:  1,000 mg     docusate sodium 100 MG Caps   Take 100 mg by mouth 2 Times a Day.  Dose:  100 mg     ferrous sulfate 325 (65 Fe) MG tablet   Take 1 Tab by mouth 3 times a day, with meals.  Dose:  325 mg     ibuprofen 800 MG Tabs  Commonly known as:  MOTRIN   Take 1 Tab by mouth every 8 hours as needed.  Dose:  800 mg     oxyCODONE immediate-release 5 MG Tabs  Commonly known as:  ROXICODONE   Take 1 Tab by mouth every 6 hours as needed for up to 5 days.  Dose:  5 mg        CONTINUE taking these medications      Instructions   PRENATAL 1 PO   Take  by mouth.                Sandra Levy M.D.

## 2020-05-22 NOTE — PROGRESS NOTES
Name:  Yehuda   ID Number:  [id number]    Content Discussed:  Mother and infant discharge instructions completed by Lindsay (discharge RN) and this RN via language line, parents have no other questions at this time and verbalize understanding of follow-up appointments and when to call MD; mother and infant assessment and VS at baseline; infant placed in car seat by parents and they were escorted out to car by a CNA

## 2020-05-22 NOTE — CARE PLAN
Problem: Altered physiologic condition related to postoperative  delivery  Goal: Patient physiologically stable as evidenced by normal lochia, palpable uterine involution and vital signs within normal limits  Description: VSS. Fundus firm with light lochia. Patient educated on when to pull emergency call light.   Outcome: PROGRESSING AS EXPECTED  Note: Fundus firm, lochia light. VSS.      Problem: Potential for postpartum infection related to surgical incision, compromised uterine condition, urinary tract or respiratory compromise  Goal: Patient will be afebrile and free from signs and symptoms of infection  Description: Patient remains afebrile. No s/sx of infection at this time. Pressure dressing in place, CDI.   Outcome: PROGRESSING AS EXPECTED  Note: No s/s of infection, VSS.

## 2020-05-22 NOTE — PROGRESS NOTES
Received report from NANCY Rock. Infant at bedside in open crib no signs of distress. Pt resting in bed, discussed plan of care and pain management for the night. Pt states she will call staff if she requires pain interventions or assistance through the day. Scheduled medication administered per MAR. No further needs at this time.

## 2020-05-22 NOTE — PROGRESS NOTES
Name:  Hunter   ID Number:  [id number]    Content Discussed:  POC, discharge paperwork discussed (Welsh VERSION); MOB incision care (staples removed by this RN) including to notify OB of drainage and tearing sensations, what to look for, splinting, no heavy lifting; infant safe sleep, bottle feeding techniques, proper burping techniques, and volumes; troubleshooting infant crying discussed

## 2020-05-22 NOTE — DISCHARGE INSTRUCTIONS
DISCHARGE INSTRUCTIONS (Danish) POSTPARTUM FOR MOM      CAMRON LAS ABEL:  · Antes de tocar el bebé.  · Antes del amamantamiento o darle al bebé lactancia artificial.  · Después de usar el baño.    CUIDADO DE LAS HERIDAS:  · La Incisión de la Operación Cesárea:  Mantenga limpea y seca, NO levante nada que pesa más que cuevas bebé por 6 semenas.  No debe ninguna apertura ni pus.  · Episiotomía/Ita Vaginal:  Use un spray de Tucks o Dermoplast, tome un baño de asiento cuando necesite (6 pulgadas), siga usando la botella Pia hasta que pare de sangrar.    CUIDADA DEL SENO:  · Lleve un sostén.  · Si esta` amamantando no use jabón en el seno ni en los pezones.  · Aplique lanolina si los pezones se ponen quebrazados y si le duelen.    CUIDADO DE LA VAGINA:  · [place]well puede entrar la vagina por 6 semanas:  Actividad sexual, Ducha vaginal, Tampones.  · El sangramiento puede seguir por 2 a 4 semanas.  La cantidad es variable.  Llame a cuevas med`ico(a) obstetra si hay mucha floyd (si usa ma`s de orlin toalla femenina cada hora).    CONTRACEPCIÒN:  · Es posible embarazarse en cualquier tiempo despus del parto y mientras el amamantamiento.  · Debe planear de discutir los metodos de cantracepción con cuevas médico(a) cuando vuelva en 6 semanas para cuevas revisión médica.    DIETA Y DEFECACÒN:  · Para evitar la constipación coma mas fibra (cereal salvado, fruta, y verduras) Y tome muchos liquidos.   · Es común orinar frecuentemente después del parto.    POSTPARTO Y LA DEPRESÒN:  Magali los primeros peterson después del parto es común sentirse:  · Impaciente, irritable, o llorar  Estos sentimientos llegan y van rapidamente.  No obstante, ranjana orlin de cada theo mujeres tiene síntomas emocionales conocidos abdoulaye depresión postparto.  · Despresión Postparto:  Puede empezar tan pronto abdoulaye el cat o tercer día después del parto o puede durar algunas semanas o meses para desarrollar.  Síntomas de la depresión pueden presentarse, carlos son más  intensos:  · Pérdida del hambre  · Frecuencia de llorar  · Sentirse desesperada o perder el control  · Demasiada o no suficiente preocupación con cuevas bebé  · Tener miedo de tocar el bebé  · No preocuparse con cuevas propia apariencia  · Incapacidad de dormir o dormir excesivamente    DEPRESIÒN / RIESGO AL SUICIDIO:    Cuando se le da de jeff de alguna entidad de [facility], es importante aprender a mantener a stefan de hacerse daño a sí mismo.    Reconocer los signos de advertencia:  · Cambios bruscos en la peronalidad, positiva o negativa, incluyendo aumento de la energia  · Regalar posesiones  · Cambios en patrones de comer - significativos cambios de peso - positivos o negativos  · Cambio de patrones para dormir - no poder dormir o dormir todo el tiempo  · Falta de voluntad o incapacidad de comunicarse  · Depresión  · Rekha, desánimo y tristeza inusual  · Habla de querer morir  · Descuido del aspecto personal  · Rebeldía - comportamiento imprudente  · Retiro de personas y actividades que les gusta  · Confusión-incapacidad para concentrarse    Si usted o un ser querido observa cualquiera de estos comportamientos o tiene preocupaciones de hacerse daño a si mismo, aquí le damos lo que usted puede hacer:  · Hablar de ti - tus sentimientos y razones para hacerse susannah a si mismo  · Retire cualquier medio que se podría utilizar para lastimarse (ejemplos: píldoras, cuerdas, cordones de extensión, arma de shona)  · Obtenga ayuda profesional de la comunidad (rupesh mental, abuso de sustancias, orientación psicológica)  · No estar solo: llamar a un contacto seguro - orlin persona que confía en que estará allí por usted  · Llamada local LINEA de CRISIS [phone] y [phone]  · Llamada local [employer] Crisis de [place] de [place] [phone] or www.[employer].com  · Llamada gratuita nacional, líneas de prevención del suicidio  · Preventión del Suicidio Nacional [facility] 259-419-HQRY  (9128)  · Línea Nacional de la [place] de [place] 800-SUICIDE ([phone])       ([employer])        POSTPARTUM DISCHARGE INSTRUCTIONS FOR MOM    YOB: 1990   Age: 30 y.o.               Admit Date: 2020     Discharge Date: 2020  Attending Doctor:  FERNANDA Feng*                  Allergies:  Patient has no known allergies.    Discharged to home by car. Discharged via wheelchair, hospital escort: Yes.  Special equipment needed: Not Applicable  Belongings with: Personal  Be sure to schedule a follow-up appointment with your primary care doctor or any specialists as instructed.     Discharge Plan:   Diet Plan: Discussed  Activity Level: Discussed  Confirmed Follow up Appointment: Appointment Scheduled  Confirmed Symptoms Management: Discussed  Medication Reconciliation Updated: Yes    REASONS TO CALL YOUR OBSTETRICIAN:  1.   Persistent fever or shaking chills (Temperature higher than 100.4)  2.   Heavy bleeding (soaking more than 1 pad per hour); Passing clots  3.   Foul odor from vagina  4.   Mastitis (Breast infection; breast pain, chills, fever, redness)  5.   Urinary pain, burning or frequency  6.   Abdominal incision infection  7.   Severe depression longer than 24 hours    HAND WASHING  · Prior to handling the baby.  · Before breastfeeding or bottle feeding baby.  · After using the bathroom or changing the baby's diaper.    WOUND CARE  Ask your physician for additional care instructions.  In general:    ·  Incision:      · Keep clean and dry.    · Do NOT lift anything heavier than your baby for up to 6 weeks.    · There should not be any opening or pus.      VAGINAL CARE  · Nothing inside vagina for 6 weeks: no sexual intercourse, tampons or douching.  · Bleeding may continue for 2-4 weeks.  Amount may vary.    · Call your physician for heavy bleeding which means soaking more than 1 pad per hour    BIRTH CONTROL  · It is possible to become pregnant at any time  "after delivery and while breastfeeding.  · Plan to discuss a method of birth control with your physician at your follow up visit. visit.    DIET AND ELIMINATION  · Eating more fiber (bran cereal, fruits, and vegetables) and drinking plenty of fluids will help to avoid constipation.  · Urinary frequency after childbirth is normal.    POSTPARTUM BLUES  During the first few days after birth, you may experience a sense of the \"blues\" which may include impatience, irritability or even crying.  These feeling come and go quickly.  However, as many as 1 in 10 women experience emotional symptoms known as postpartum depression.    Postpartum depression:  May start as early as the second or third day after delivery or take several weeks or months to develop.  Symptoms of \"blues\" are present, but are more intense:  Crying spells; loss of appetite; feelings of hopelessness or loss of control; fear of touching the baby; over concern or no concern at all about the baby; little or no concern about your own appearance/caring for yourself; and/or inability to sleep or excessive sleeping.  Contact your physician if you are experiencing any of these symptoms.    Crisis Hotline:  · [place] Crisis Hotline:  7-680-QGVYNHJ  Or 1-441.591.1633  · [place] Crisis Hotline:  1-864.253.9164  Or 486-084-6384    PREVENTING SHAKEN BABY:  If you are angry or stressed, PUT THE BABY IN THE CRIB, step away, take some deep breaths, and wait until you are calm to care for the baby.  DO NOT SHAKE THE BABY.  You are not alone, call a supporter for help.    · Crisis Call Center 24/7 crisis line 490-992-2285 or 1-101.629.3385  · You can also text them, text \"ANSWER\" to 162742    QUIT SMOKING/TOBACCO USE:  I understand the use of any tobacco products increases my chance of suffering from future heart disease and could cause other illnesses which may shorten my life. Quitting the use of tobacco products is the single most important thing I can do to improve my " health. For further information on smoking / tobacco cessation call a Toll Free Quit Line at 1-224.626.5187 (*[employer] [place]) or 1-904.159.4879 (American Lung Association) or you can access the web based program at www.lungusa.org.    · [place] Tobacco Users Help Line:  (586) 486-7693       Toll Free: 1-380.590.9458  · Quit Tobacco Program [facility] (555)206-1657    DEPRESSION / SUICIDE RISK:  As you are discharged from this [facility], it is important to learn how to keep safe from harming yourself.    Recognize the warning signs:  · Abrupt changes in personality, positive or negative- including increase in energy   · Giving away possessions  · Change in eating patterns- significant weight changes-  positive or negative  · Change in sleeping patterns- unable to sleep or sleeping all the time   · Unwillingness or inability to communicate  · Depression  · Unusual sadness, discouragement and loneliness  · Talk of wanting to die  · Neglect of personal appearance   · Rebelliousness- reckless behavior  · Withdrawal from people/activities they love  · Confusion- inability to concentrate     If you or a loved one observes any of these behaviors or has concerns about self-harm, here's what you can do:  · Talk about it- your feelings and reasons for harming yourself  · Remove any means that you might use to hurt yourself (examples: pills, rope, extension cords, firearm)  · Get professional help from the community (Mental Health, Substance Abuse, psychological counseling)  · Do not be alone:Call your Safe Contact- someone whom you trust who will be there for you.  · Call your local CRISIS HOTLINE 084-3482 or 889-060-0261  · Call your local Children's Mobile Crisis Response Team [place] (599) 089-9173 or www.[employer]  · Call the toll free National Suicide Prevention Hotlines   · National Suicide Prevention Lifeline 532-291-OEAM (9508)  · National Hope Line Network  800-SUICIDE (814-2807)    DISCHARGE SURVEY:  Thank you for choosing [facility].  We hope we provided you with very good care.  You may be receiving a survey in the mail.  Please fill it out.  Your opinion is valuable to us.        My signature on this form indicates that:  1.  I have reviewed and understand the above information  2.  My questions regarding this information have been answered to my satisfaction.  3.  I have formulated a plan with my discharge nurse to obtain my prescribed medication for home.

## 2020-05-22 NOTE — CARE PLAN
Problem: Medication  Goal: Compliance with prescribed medication will improve  Outcome: MET     Problem: Communication  Goal: The ability to communicate needs accurately and effectively will improve  Outcome: MET     Problem: Safety  Goal: Will remain free from injury  Outcome: MET  Goal: Will remain free from falls  Outcome: MET     Problem: Infection  Goal: Will remain free from infection  Outcome: MET     Problem: Venous Thromboembolism (VTW)/Deep Vein Thrombosis (DVT) Prevention:  Goal: Patient will participate in Venous Thrombosis (VTE)/Deep Vein Thrombosis (DVT)Prevention Measures  Outcome: MET     Problem: Bowel/Gastric:  Goal: Normal bowel function is maintained or improved  Outcome: MET  Goal: Will not experience complications related to bowel motility  Outcome: MET     Problem: Knowledge Deficit  Goal: Knowledge of disease process/condition, treatment plan, diagnostic tests, and medications will improve  Outcome: MET  Goal: Knowledge of the prescribed therapeutic regimen will improve  Outcome: MET     Problem: Discharge Barriers/Planning  Goal: Patient's continuum of care needs will be met  Outcome: MET     Problem: Pain Management  Goal: Pain level will decrease to patient's comfort goal  Outcome: MET     Problem: Altered physiologic condition related to postoperative  delivery  Goal: Patient physiologically stable as evidenced by normal lochia, palpable uterine involution and vital signs within normal limits  Description: VSS. Fundus firm with light lochia. Patient educated on when to pull emergency call light.   Outcome: MET     Problem: Potential for postpartum infection related to surgical incision, compromised uterine condition, urinary tract or respiratory compromise  Goal: Patient will be afebrile and free from signs and symptoms of infection  Description: Patient remains afebrile. No s/sx of infection at this time. Pressure dressing in place, CDI.   Outcome: MET     Problem: Alteration  in comfort related to surgical incision and/or after birth pains  Goal: Patient is able to ambulate, care for self and infant with acceptable pain level  Outcome: MET  Goal: Patient verbalizes acceptable pain level  Outcome: MET     Problem: Potential knowledge deficit related to lack of understanding of self and  care  Goal: Patient will verbalize understanding of self and infant care  Outcome: MET  Goal: Patient will demonstrate ability to care for self and infant  Outcome: MET     Problem: Potential anxiety related to difficulty adapting to parental role  Goal: Patient will verbalize and demonstrate effective bonding and parenting behavior  Outcome: MET

## 2020-06-08 ENCOUNTER — POST PARTUM (OUTPATIENT)
Dept: OBGYN | Facility: CLINIC | Age: 30
End: 2020-06-08

## 2020-06-08 VITALS — WEIGHT: 143 LBS | DIASTOLIC BLOOD PRESSURE: 54 MMHG | SYSTOLIC BLOOD PRESSURE: 102 MMHG | BODY MASS INDEX: 30.94 KG/M2

## 2020-06-08 DIAGNOSIS — Z09 POSTOP CHECK: ICD-10-CM

## 2020-06-08 PROCEDURE — 99024 POSTOP FOLLOW-UP VISIT: CPT | Performed by: OBSTETRICS & GYNECOLOGY

## 2020-06-08 NOTE — PROGRESS NOTES
Patient here for C Section check.  C Section done on 5/19/2020  Patient is bottle feeding  Vaginal bleeding is very light  PP visit needs to be scheduled  Phone number: [phone]  Pharmacy verified

## 2020-06-08 NOTE — PROGRESS NOTES
C/c: INCISION CHECK    HPI: Patient a  postop from  section on 2020 for fetal intolerance to labor.   Has resumed regular diet.   Bottle feeding  Lochia: very light  Desires Nexplanon for ppx    Pregnancy complicated by:   Patient Active Problem List   Diagnosis   • Rubella non-immune status, antepartum   • Supervision of other normal pregnancy, antepartum   • Abnormal pregnancy US       Review of systems:  Gen: denies fever or chills  Breast: denies breast pain or tenderness  GI: denies constipation, passing gas, no diarrhea  : denies dysuria or excessive vaginal bleeding  Ext: nontender bl, no edema, no lesions    O:  Gen: AAO in NAD  Abd: nontender, non distended, no rebound or guarding, incision clean and dry with intact sutures.   Extremities: nonedematous bilaterally  Psych: Normal mood, appriopriate affect    AP: 30 y.o.  at postop day 2wk from  section.   - postpartum prophylaxis: Information for the health department was given to the patient as she has no insurance.  Advised that she may have this device installed now.  - PNV while breast feeding  - continue pelvic rest  - RTC in 4 weeks for postpartum exam

## 2021-05-18 PROCEDURE — 99291 CRITICAL CARE FIRST HOUR: CPT

## 2021-05-19 ENCOUNTER — APPOINTMENT (OUTPATIENT)
Dept: RADIOLOGY | Facility: MEDICAL CENTER | Age: 31
End: 2021-05-19
Attending: EMERGENCY MEDICINE
Payer: MEDICAID

## 2021-05-19 ENCOUNTER — HOSPITAL ENCOUNTER (EMERGENCY)
Facility: MEDICAL CENTER | Age: 31
End: 2021-05-19
Attending: EMERGENCY MEDICINE | Admitting: SURGERY
Payer: MEDICAID

## 2021-05-19 ENCOUNTER — ANESTHESIA (OUTPATIENT)
Dept: SURGERY | Facility: MEDICAL CENTER | Age: 31
End: 2021-05-19
Payer: MEDICAID

## 2021-05-19 ENCOUNTER — ANESTHESIA EVENT (OUTPATIENT)
Dept: SURGERY | Facility: MEDICAL CENTER | Age: 31
End: 2021-05-19
Payer: MEDICAID

## 2021-05-19 VITALS
DIASTOLIC BLOOD PRESSURE: 66 MMHG | HEIGHT: 57 IN | RESPIRATION RATE: 13 BRPM | WEIGHT: 140.65 LBS | TEMPERATURE: 98 F | OXYGEN SATURATION: 95 % | HEART RATE: 74 BPM | BODY MASS INDEX: 30.34 KG/M2 | SYSTOLIC BLOOD PRESSURE: 115 MMHG

## 2021-05-19 DIAGNOSIS — K81.0 ACUTE CHOLECYSTITIS: ICD-10-CM

## 2021-05-19 DIAGNOSIS — G89.18 POSTOPERATIVE PAIN: ICD-10-CM

## 2021-05-19 LAB
ALBUMIN SERPL BCP-MCNC: 4.8 G/DL (ref 3.2–4.9)
ALBUMIN/GLOB SERPL: 1.3 G/DL
ALP SERPL-CCNC: 85 U/L (ref 30–99)
ALT SERPL-CCNC: 31 U/L (ref 2–50)
ANION GAP SERPL CALC-SCNC: 11 MMOL/L (ref 7–16)
AST SERPL-CCNC: 18 U/L (ref 12–45)
BASOPHILS # BLD AUTO: 0.4 % (ref 0–1.8)
BASOPHILS # BLD: 0.04 K/UL (ref 0–0.12)
BILIRUB SERPL-MCNC: 0.4 MG/DL (ref 0.1–1.5)
BUN SERPL-MCNC: 14 MG/DL (ref 8–22)
CALCIUM SERPL-MCNC: 9.6 MG/DL (ref 8.5–10.5)
CHLORIDE SERPL-SCNC: 106 MMOL/L (ref 96–112)
CO2 SERPL-SCNC: 22 MMOL/L (ref 20–33)
CREAT SERPL-MCNC: 0.5 MG/DL (ref 0.5–1.4)
EOSINOPHIL # BLD AUTO: 0.12 K/UL (ref 0–0.51)
EOSINOPHIL NFR BLD: 1.2 % (ref 0–6.9)
ERYTHROCYTE [DISTWIDTH] IN BLOOD BY AUTOMATED COUNT: 43.8 FL (ref 35.9–50)
GLOBULIN SER CALC-MCNC: 3.7 G/DL (ref 1.9–3.5)
GLUCOSE SERPL-MCNC: 100 MG/DL (ref 65–99)
HCG SERPL QL: NEGATIVE
HCT VFR BLD AUTO: 42.4 % (ref 37–47)
HGB BLD-MCNC: 14.2 G/DL (ref 12–16)
IMM GRANULOCYTES # BLD AUTO: 0.04 K/UL (ref 0–0.11)
IMM GRANULOCYTES NFR BLD AUTO: 0.4 % (ref 0–0.9)
LIPASE SERPL-CCNC: 27 U/L (ref 11–82)
LYMPHOCYTES # BLD AUTO: 3.14 K/UL (ref 1–4.8)
LYMPHOCYTES NFR BLD: 30.3 % (ref 22–41)
MCH RBC QN AUTO: 31.2 PG (ref 27–33)
MCHC RBC AUTO-ENTMCNC: 33.5 G/DL (ref 33.6–35)
MCV RBC AUTO: 93.2 FL (ref 81.4–97.8)
MONOCYTES # BLD AUTO: 0.72 K/UL (ref 0–0.85)
MONOCYTES NFR BLD AUTO: 6.9 % (ref 0–13.4)
NEUTROPHILS # BLD AUTO: 6.32 K/UL (ref 2–7.15)
NEUTROPHILS NFR BLD: 60.8 % (ref 44–72)
NRBC # BLD AUTO: 0 K/UL
NRBC BLD-RTO: 0 /100 WBC
PATHOLOGY CONSULT NOTE: NORMAL
PLATELET # BLD AUTO: 389 K/UL (ref 164–446)
PMV BLD AUTO: 10.4 FL (ref 9–12.9)
POTASSIUM SERPL-SCNC: 4 MMOL/L (ref 3.6–5.5)
PROT SERPL-MCNC: 8.5 G/DL (ref 6–8.2)
RBC # BLD AUTO: 4.55 M/UL (ref 4.2–5.4)
SARS-COV+SARS-COV-2 AG RESP QL IA.RAPID: NOTDETECTED
SARS-COV-2 RNA RESP QL NAA+PROBE: NOTDETECTED
SODIUM SERPL-SCNC: 139 MMOL/L (ref 135–145)
SPECIMEN SOURCE: NORMAL
SPECIMEN SOURCE: NORMAL
WBC # BLD AUTO: 10.4 K/UL (ref 4.8–10.8)

## 2021-05-19 PROCEDURE — 160046 HCHG PACU - 1ST 60 MINS PHASE II: Performed by: SURGERY

## 2021-05-19 PROCEDURE — 87426 SARSCOV CORONAVIRUS AG IA: CPT

## 2021-05-19 PROCEDURE — 700101 HCHG RX REV CODE 250: Performed by: SURGERY

## 2021-05-19 PROCEDURE — 700105 HCHG RX REV CODE 258: Performed by: ANESTHESIOLOGY

## 2021-05-19 PROCEDURE — 700102 HCHG RX REV CODE 250 W/ 637 OVERRIDE(OP): Performed by: EMERGENCY MEDICINE

## 2021-05-19 PROCEDURE — A9270 NON-COVERED ITEM OR SERVICE: HCPCS | Performed by: EMERGENCY MEDICINE

## 2021-05-19 PROCEDURE — C9803 HOPD COVID-19 SPEC COLLECT: HCPCS | Performed by: EMERGENCY MEDICINE

## 2021-05-19 PROCEDURE — 502571 HCHG PACK, LAP CHOLE: Performed by: SURGERY

## 2021-05-19 PROCEDURE — 160025 RECOVERY II MINUTES (STATS): Performed by: SURGERY

## 2021-05-19 PROCEDURE — 36415 COLL VENOUS BLD VENIPUNCTURE: CPT

## 2021-05-19 PROCEDURE — 160048 HCHG OR STATISTICAL LEVEL 1-5: Performed by: SURGERY

## 2021-05-19 PROCEDURE — 160035 HCHG PACU - 1ST 60 MINS PHASE I: Performed by: SURGERY

## 2021-05-19 PROCEDURE — 160009 HCHG ANES TIME/MIN: Performed by: SURGERY

## 2021-05-19 PROCEDURE — 700111 HCHG RX REV CODE 636 W/ 250 OVERRIDE (IP): Performed by: EMERGENCY MEDICINE

## 2021-05-19 PROCEDURE — 160041 HCHG SURGERY MINUTES - EA ADDL 1 MIN LEVEL 4: Performed by: SURGERY

## 2021-05-19 PROCEDURE — 160002 HCHG RECOVERY MINUTES (STAT): Performed by: SURGERY

## 2021-05-19 PROCEDURE — 80053 COMPREHEN METABOLIC PANEL: CPT

## 2021-05-19 PROCEDURE — 700105 HCHG RX REV CODE 258: Performed by: EMERGENCY MEDICINE

## 2021-05-19 PROCEDURE — 99291 CRITICAL CARE FIRST HOUR: CPT

## 2021-05-19 PROCEDURE — 501570 HCHG TROCAR, SEPARATOR: Performed by: SURGERY

## 2021-05-19 PROCEDURE — U0005 INFEC AGEN DETEC AMPLI PROBE: HCPCS

## 2021-05-19 PROCEDURE — 501577 HCHG TROCAR, STEP 11MM: Performed by: SURGERY

## 2021-05-19 PROCEDURE — 85025 COMPLETE CBC W/AUTO DIFF WBC: CPT

## 2021-05-19 PROCEDURE — 501583 HCHG TROCAR, THRD CAN&SEAL 5X100: Performed by: SURGERY

## 2021-05-19 PROCEDURE — 500868 HCHG NEEDLE, SURGI(VARES): Performed by: SURGERY

## 2021-05-19 PROCEDURE — 501838 HCHG SUTURE GENERAL: Performed by: SURGERY

## 2021-05-19 PROCEDURE — 160029 HCHG SURGERY MINUTES - 1ST 30 MINS LEVEL 4: Performed by: SURGERY

## 2021-05-19 PROCEDURE — 96374 THER/PROPH/DIAG INJ IV PUSH: CPT

## 2021-05-19 PROCEDURE — 84703 CHORIONIC GONADOTROPIN ASSAY: CPT

## 2021-05-19 PROCEDURE — 700101 HCHG RX REV CODE 250: Performed by: ANESTHESIOLOGY

## 2021-05-19 PROCEDURE — 700111 HCHG RX REV CODE 636 W/ 250 OVERRIDE (IP): Performed by: ANESTHESIOLOGY

## 2021-05-19 PROCEDURE — 88304 TISSUE EXAM BY PATHOLOGIST: CPT

## 2021-05-19 PROCEDURE — 700102 HCHG RX REV CODE 250 W/ 637 OVERRIDE(OP): Performed by: ANESTHESIOLOGY

## 2021-05-19 PROCEDURE — 76705 ECHO EXAM OF ABDOMEN: CPT

## 2021-05-19 PROCEDURE — 83690 ASSAY OF LIPASE: CPT

## 2021-05-19 PROCEDURE — A9270 NON-COVERED ITEM OR SERVICE: HCPCS | Performed by: ANESTHESIOLOGY

## 2021-05-19 PROCEDURE — U0003 INFECTIOUS AGENT DETECTION BY NUCLEIC ACID (DNA OR RNA); SEVERE ACUTE RESPIRATORY SYNDROME CORONAVIRUS 2 (SARS-COV-2) (CORONAVIRUS DISEASE [COVID-19]), AMPLIFIED PROBE TECHNIQUE, MAKING USE OF HIGH THROUGHPUT TECHNOLOGIES AS DESCRIBED BY CMS-2020-01-R: HCPCS

## 2021-05-19 PROCEDURE — 160047 HCHG PACU  - EA ADDL 30 MINS PHASE II: Performed by: SURGERY

## 2021-05-19 RX ORDER — HALOPERIDOL 5 MG/ML
1 INJECTION INTRAMUSCULAR
Status: DISCONTINUED | OUTPATIENT
Start: 2021-05-19 | End: 2021-05-19 | Stop reason: HOSPADM

## 2021-05-19 RX ORDER — LABETALOL HYDROCHLORIDE 5 MG/ML
5 INJECTION, SOLUTION INTRAVENOUS
Status: DISCONTINUED | OUTPATIENT
Start: 2021-05-19 | End: 2021-05-19 | Stop reason: HOSPADM

## 2021-05-19 RX ORDER — ONDANSETRON 4 MG/1
4 TABLET, FILM COATED ORAL EVERY 4 HOURS PRN
Qty: 10 TABLET | Refills: 0 | Status: SHIPPED | OUTPATIENT
Start: 2021-05-19 | End: 2021-05-24

## 2021-05-19 RX ORDER — BUPIVACAINE HYDROCHLORIDE AND EPINEPHRINE 5; 5 MG/ML; UG/ML
INJECTION, SOLUTION EPIDURAL; INTRACAUDAL; PERINEURAL
Status: DISCONTINUED | OUTPATIENT
Start: 2021-05-19 | End: 2021-05-19 | Stop reason: HOSPADM

## 2021-05-19 RX ORDER — ONDANSETRON 2 MG/ML
4 INJECTION INTRAMUSCULAR; INTRAVENOUS
Status: DISCONTINUED | OUTPATIENT
Start: 2021-05-19 | End: 2021-05-19 | Stop reason: HOSPADM

## 2021-05-19 RX ORDER — SODIUM CHLORIDE 9 MG/ML
1000 INJECTION, SOLUTION INTRAVENOUS ONCE
Status: COMPLETED | OUTPATIENT
Start: 2021-05-19 | End: 2021-05-19

## 2021-05-19 RX ORDER — SODIUM CHLORIDE, SODIUM LACTATE, POTASSIUM CHLORIDE, CALCIUM CHLORIDE 600; 310; 30; 20 MG/100ML; MG/100ML; MG/100ML; MG/100ML
INJECTION, SOLUTION INTRAVENOUS CONTINUOUS
Status: DISCONTINUED | OUTPATIENT
Start: 2021-05-19 | End: 2021-05-19 | Stop reason: HOSPADM

## 2021-05-19 RX ORDER — OXYCODONE HCL 5 MG/5 ML
5 SOLUTION, ORAL ORAL
Status: COMPLETED | OUTPATIENT
Start: 2021-05-19 | End: 2021-05-19

## 2021-05-19 RX ORDER — OXYCODONE HCL 5 MG/5 ML
10 SOLUTION, ORAL ORAL
Status: COMPLETED | OUTPATIENT
Start: 2021-05-19 | End: 2021-05-19

## 2021-05-19 RX ORDER — ONDANSETRON 2 MG/ML
INJECTION INTRAMUSCULAR; INTRAVENOUS PRN
Status: DISCONTINUED | OUTPATIENT
Start: 2021-05-19 | End: 2021-05-19 | Stop reason: SURG

## 2021-05-19 RX ORDER — OXYCODONE HYDROCHLORIDE 5 MG/1
5 TABLET ORAL EVERY 4 HOURS PRN
Qty: 30 TABLET | Refills: 0 | Status: SHIPPED | OUTPATIENT
Start: 2021-05-19 | End: 2021-05-24

## 2021-05-19 RX ORDER — HYDRALAZINE HYDROCHLORIDE 20 MG/ML
5 INJECTION INTRAMUSCULAR; INTRAVENOUS
Status: DISCONTINUED | OUTPATIENT
Start: 2021-05-19 | End: 2021-05-19 | Stop reason: HOSPADM

## 2021-05-19 RX ORDER — METOCLOPRAMIDE HYDROCHLORIDE 5 MG/ML
INJECTION INTRAMUSCULAR; INTRAVENOUS PRN
Status: DISCONTINUED | OUTPATIENT
Start: 2021-05-19 | End: 2021-05-19 | Stop reason: SURG

## 2021-05-19 RX ORDER — HYDROMORPHONE HYDROCHLORIDE 1 MG/ML
0.4 INJECTION, SOLUTION INTRAMUSCULAR; INTRAVENOUS; SUBCUTANEOUS
Status: DISCONTINUED | OUTPATIENT
Start: 2021-05-19 | End: 2021-05-19 | Stop reason: HOSPADM

## 2021-05-19 RX ORDER — HYDROMORPHONE HYDROCHLORIDE 1 MG/ML
0.1 INJECTION, SOLUTION INTRAMUSCULAR; INTRAVENOUS; SUBCUTANEOUS
Status: DISCONTINUED | OUTPATIENT
Start: 2021-05-19 | End: 2021-05-19 | Stop reason: HOSPADM

## 2021-05-19 RX ORDER — DEXAMETHASONE SODIUM PHOSPHATE 4 MG/ML
INJECTION, SOLUTION INTRA-ARTICULAR; INTRALESIONAL; INTRAMUSCULAR; INTRAVENOUS; SOFT TISSUE PRN
Status: DISCONTINUED | OUTPATIENT
Start: 2021-05-19 | End: 2021-05-19 | Stop reason: SURG

## 2021-05-19 RX ORDER — KETOROLAC TROMETHAMINE 30 MG/ML
INJECTION, SOLUTION INTRAMUSCULAR; INTRAVENOUS PRN
Status: DISCONTINUED | OUTPATIENT
Start: 2021-05-19 | End: 2021-05-19 | Stop reason: SURG

## 2021-05-19 RX ORDER — SODIUM CHLORIDE, SODIUM LACTATE, POTASSIUM CHLORIDE, CALCIUM CHLORIDE 600; 310; 30; 20 MG/100ML; MG/100ML; MG/100ML; MG/100ML
INJECTION, SOLUTION INTRAVENOUS
Status: DISCONTINUED | OUTPATIENT
Start: 2021-05-19 | End: 2021-05-19 | Stop reason: SURG

## 2021-05-19 RX ORDER — MEPERIDINE HYDROCHLORIDE 25 MG/ML
12.5 INJECTION INTRAMUSCULAR; INTRAVENOUS; SUBCUTANEOUS
Status: DISCONTINUED | OUTPATIENT
Start: 2021-05-19 | End: 2021-05-19 | Stop reason: HOSPADM

## 2021-05-19 RX ORDER — HYDROMORPHONE HYDROCHLORIDE 1 MG/ML
0.2 INJECTION, SOLUTION INTRAMUSCULAR; INTRAVENOUS; SUBCUTANEOUS
Status: DISCONTINUED | OUTPATIENT
Start: 2021-05-19 | End: 2021-05-19 | Stop reason: HOSPADM

## 2021-05-19 RX ORDER — LIDOCAINE HYDROCHLORIDE 20 MG/ML
INJECTION, SOLUTION EPIDURAL; INFILTRATION; INTRACAUDAL; PERINEURAL PRN
Status: DISCONTINUED | OUTPATIENT
Start: 2021-05-19 | End: 2021-05-19 | Stop reason: SURG

## 2021-05-19 RX ADMIN — CEFTRIAXONE SODIUM 2 G: 2 INJECTION, POWDER, FOR SOLUTION INTRAMUSCULAR; INTRAVENOUS at 06:45

## 2021-05-19 RX ADMIN — ROCURONIUM BROMIDE 20 MG: 10 INJECTION, SOLUTION INTRAVENOUS at 09:54

## 2021-05-19 RX ADMIN — LIDOCAINE HYDROCHLORIDE 100 MG: 20 INJECTION, SOLUTION EPIDURAL; INFILTRATION; INTRACAUDAL at 09:54

## 2021-05-19 RX ADMIN — FENTANYL CITRATE 50 MCG: 50 INJECTION, SOLUTION INTRAMUSCULAR; INTRAVENOUS at 10:06

## 2021-05-19 RX ADMIN — SUGAMMADEX 200 MG: 100 INJECTION, SOLUTION INTRAVENOUS at 10:42

## 2021-05-19 RX ADMIN — KETOROLAC TROMETHAMINE 30 MG: 30 INJECTION, SOLUTION INTRAMUSCULAR at 10:39

## 2021-05-19 RX ADMIN — FENTANYL CITRATE 50 MCG: 50 INJECTION, SOLUTION INTRAMUSCULAR; INTRAVENOUS at 10:35

## 2021-05-19 RX ADMIN — METOCLOPRAMIDE 10 MG: 5 INJECTION, SOLUTION INTRAMUSCULAR; INTRAVENOUS at 10:39

## 2021-05-19 RX ADMIN — MIDAZOLAM 2 MG: 1 INJECTION INTRAMUSCULAR; INTRAVENOUS at 09:50

## 2021-05-19 RX ADMIN — ROCURONIUM BROMIDE 10 MG: 10 INJECTION, SOLUTION INTRAVENOUS at 10:20

## 2021-05-19 RX ADMIN — SODIUM CHLORIDE 1000 ML: 9 INJECTION, SOLUTION INTRAVENOUS at 03:19

## 2021-05-19 RX ADMIN — LIDOCAINE HYDROCHLORIDE 30 ML: 20 SOLUTION OROPHARYNGEAL at 03:19

## 2021-05-19 RX ADMIN — FENTANYL CITRATE 50 MCG: 50 INJECTION, SOLUTION INTRAMUSCULAR; INTRAVENOUS at 10:43

## 2021-05-19 RX ADMIN — Medication 160 MG: at 09:54

## 2021-05-19 RX ADMIN — DEXAMETHASONE SODIUM PHOSPHATE 4 MG: 4 INJECTION, SOLUTION INTRA-ARTICULAR; INTRALESIONAL; INTRAMUSCULAR; INTRAVENOUS; SOFT TISSUE at 09:54

## 2021-05-19 RX ADMIN — SODIUM CHLORIDE, POTASSIUM CHLORIDE, SODIUM LACTATE AND CALCIUM CHLORIDE: 600; 310; 30; 20 INJECTION, SOLUTION INTRAVENOUS at 09:50

## 2021-05-19 RX ADMIN — OXYCODONE HYDROCHLORIDE 10 MG: 5 SOLUTION ORAL at 11:05

## 2021-05-19 RX ADMIN — PROPOFOL 200 MG: 10 INJECTION, EMULSION INTRAVENOUS at 09:54

## 2021-05-19 RX ADMIN — FENTANYL CITRATE 50 MCG: 50 INJECTION, SOLUTION INTRAMUSCULAR; INTRAVENOUS at 10:20

## 2021-05-19 RX ADMIN — ONDANSETRON 4 MG: 2 INJECTION INTRAMUSCULAR; INTRAVENOUS at 10:39

## 2021-05-19 ASSESSMENT — PAIN DESCRIPTION - PAIN TYPE
TYPE: SURGICAL PAIN
TYPE: SURGICAL PAIN

## 2021-05-19 ASSESSMENT — PAIN DESCRIPTION - DESCRIPTORS: DESCRIPTORS: BURNING;STABBING

## 2021-05-19 NOTE — OP REPORT
DATE OF SERVICE:  05/19/2021     PREOPERATIVE DIAGNOSIS:  Acute cholecystitis with cholelithiasis.     POSTOPERATIVE DIAGNOSIS:  Acute cholecystitis with cholelithiasis.     OPERATION:  Laparoscopic cholecystectomy.     SURGEON:  Stewart Weinberg MD     ANESTHESIOLOGIST:  Ottoniel Snider MD     ANESTHESIA:  General anesthesia.     OPERATIVE NOTE:  The patient is a 31 years of age, presents with signs and   symptoms of acute cholecystitis.  She was recommended to undergo laparoscopic   cholecystectomy.  The risks, possible complications of operation were   carefully explained to her through  to her satisfaction.    She agreed to proceed.  She had a satisfactory preoperative evaluation,   received detailed postoperative care instructions.  In the event, she can be   treated on an ambulatory basis.  The patient had received therapeutic   antibiotics.  She signed consents for surgery and anesthesia and was taken to   the operating room and placed under anesthesia by Dr. Snider.  Once   anesthetized, sequential stockings were applied as anti-embolism prophylaxis.    Her abdomen was prepped with ChloraPrep solution, sterile drapes were applied   and a time-out was affected.  A solution of 0.5% Marcaine with epinephrine   was liberally infiltrated in all incisions.  An infraumbilical incision was   made and fascia was elevated.  Veress needle was inserted.  Saline drop test   was permissive to proceed with step pneumoinsufflation with carbon dioxide   gas.  Once fully pneumoinsufflated, a 5 mm trocar was placed and video   laparoscopy ensued.  The patient was found to have acutely inflamed   gallbladder.  The patient had 11 mm trocar placed in the epigastrium.  A   single 5 mm trocar was placed in the right upper quadrant.  The gallbladder   was grasped and placed on traction.  The patient had some inflammatory   adhesions taken down using electrocautery.  She had an inflamed triangle of   Calot.  The patient  had gradual dissection of the triangle of Calot, which was   impeded by an overlying fetal lobulation of the right lobe of the liver, but   ultimately was accomplished.  She had a very short cystic duct, which was   doubly clipped and divided.  The cystic artery was identified, doubly clipped   and divided.  She had a posterior cystic artery as well, which was partially   transected when the cystic duct was transected and this was eventually   identified and clipped and divided.  The patient had the gallbladder taken out   in retrograde fashion using countertraction electrocautery.  It had been   partially decompressed with a needle prior to grasping.  The patient had the   gallbladder  from the gallbladder fossa, which was made hemostatic   using electrocautery.  The gallbladder was delivered through the epigastric   incision via an Endosac.  Pneumoperitoneum was re-established.  The right   upper quadrant was copiously irrigated and decompressed.  Hemostasis was quite   satisfactory.  There did not appear to be any complications.  The gallbladder   itself was inspected.  There was no extrahepatic biliary ducts associated   with the infundibulum.  The patient was felt to have had operation proceeded   safely.  Trocar in the epigastrium was removed.  The fascia was closed using   an EndoClose device with 0 Vicryl suture.  The skin was closed using running   4-0 Monocryl subcuticulars and Steri-Strips and Band-Aid dressings were   applied.  The patient was awakened, extubated, and taken to recovery room in   stable satisfactory condition.  Estimated blood loss was 120 mL.  Sponge,   instrument and needle counts were reported as correct.  There were no   intraoperative complications.  It is anticipated the patient will be treated   on an ambulatory basis.  She was given prescriptions for oxycodone IR 5 mg #30   and Zofran 4 mg #10 in compliance with [place] regulations.  She was   instructed on multimodal pain  therapy.  She was asked to return to see me in   the office in 1 week and we anticipate a progressive improvement following   discharge.  Should she deviate from what is expected, she should call   promptly.        ______________________________  MD SOPHY HALE/RALEIGH    DD:  05/19/2021 10:51  DT:  05/19/2021 12:48    Job#:  [id number]    CC:Ottoniel Snider MD

## 2021-05-19 NOTE — ED NOTES
ERP at bedside with  explaining test results and plan of care, including pending surgery, to pt and family at bedside. Both demonstrated understanding.    Both COVID swabs collected and sent to lab.

## 2021-05-19 NOTE — PROGRESS NOTES
H and P dictated    utilized   Acute josey   Plan lap josey   Risks accepted by patient   Proceed

## 2021-05-19 NOTE — ANESTHESIA PROCEDURE NOTES
Airway    Date/Time: 5/19/2021 9:55 AM  Performed by: Ottoniel Snider M.D.  Authorized by: Ottoniel Snider M.D.     Location:  OR  Urgency:  Elective  Difficult Airway: No    Indications for Airway Management:  Anesthesia      Spontaneous Ventilation: absent    Sedation Level:  Deep  Preoxygenated: Yes    Patient Position:  Sniffing  Final Airway Type:  Endotracheal airway  Final Endotracheal Airway:  ETT  Cuffed: Yes    Technique Used for Successful ETT Placement:  Direct laryngoscopy  Devices/Methods Used in Placement:  Intubating stylet    Insertion Site:  Oral  Blade Type:  Jayla  Laryngoscope Blade/Videolaryngoscope Blade Size:  3  ETT Size (mm):  7.0  Measured from:  Teeth  ETT to Teeth (cm):  23  Placement Verified by: auscultation and capnometry    Cormack-Lehane Classification:  Grade I - full view of glottis  Number of Attempts at Approach:  1

## 2021-05-19 NOTE — OR SURGEON
Immediate Post OP Note    PreOp Diagnosis:AC +      PostOp Diagnosis: same      Procedure(s):  CHOLECYSTECTOMY, LAPAROSCOPIC - Wound Class: Clean Contaminated    Surgeon(s):  Stewart Weinberg M.D.    Anesthesiologist/Type of Anesthesia:  Anesthesiologist: Ottoniel Snider M.D./General    Surgical Staff:  Circulator: Ryan Bragg R.N.; Cari Morales R.N.  Relief Circulator: Shirley Moss R.N.  Scrub Person: Luh Bullard    Specimens removed if any:  ID Type Source Tests Collected by Time Destination   A : Gallbladder Tissue Gallbladder PATHOLOGY SPECIMEN Stewart Weinberg M.D. 5/19/2021 10:21 AM        Estimated Blood Loss: 120    Findings: same short cystic duct     Complications: 0        5/19/2021 10:46 AM Stewart Weinberg M.D.

## 2021-05-19 NOTE — H&P
DATE OF ADMISSION:  2021     REASON FOR ADMISSION:  Acute cholecystitis with cholelithiasis.     HISTORY OF PRESENT ILLNESS:  The patient is 31 years of age.  She has had   chronic recurrent epigastric and right upper quadrant abdominal pain occurring   postprandially.  Over the last 24 hours, she has had the pain rather severely   and consistently.  She came to the emergency room for evaluation.  There, she   was found to have some right upper quadrant abdominal tenderness.  Her white   count was borderline elevated.  Her COVID screen was negative.  The patient   had an ultrasound, which demonstrates multiple gallstones and pericholecystic   fluid and a diagnosis of acute cholecystitis was established.  She was felt to   be a candidate for surgical intervention, is being admitted now for that   purpose.     PAST MEDICAL HISTORY:  Unremarkable.  She has no active medical problems.  She   is not currently pregnant.  She had previous  section, which was well   tolerated.  There are no known hereditary illnesses in the family.     SOCIAL HISTORY:  She is a nonsmoker and nondrinker.  The patient's social   history is otherwise noncontributory.     REVIEW OF SYSTEMS:  Positive primarily for abdominal pain and nausea, but is   otherwise negative and noncontributory in 10 categories.     PHYSICAL EXAMINATION:  VITAL SIGNS:  Showed normal vital signs:  She has a BMI of 30.  HEENT:  Unremarkable.  There was no scleral or mucosal icterus.  NECK:  Supple.  Thyroid is normal.  LUNGS:  Clear.  There is no lymphadenopathy.  CARDIAC:  The patient has normal cardiac examination.  ABDOMEN:  Slightly tender in the right upper quadrant.  There is no palpable   mass or organomegaly.  No hernias.  She has had previous  section.  EXTREMITIES:  Free of deformity, clubbing or edema.  NEUROLOGIC:  She is intact.  SKIN:  Normal.     LABORATORY DATA:  Featured normal liver function tests and a white count of   10,400.   Her hemoglobin was 14 grams and a platelet count is 389,000.  Beta   hCG was negative and her COVID test was negative.  The ultrasound findings are   basically summarized above.     IMPRESSION AND PLAN:  At the time of admission is a probable acute   cholecystitis with cholelithiasis.  Plan is for laparoscopic appendectomy.    The risks, possible complications of such operation were carefully explained   to the patient in detail.  She was also given detailed postoperative care   instructions in the event that she can be treated on an ambulatory basis.  The   patient had an opportunity to have all questions answered.  She is Nepali   speaking only and a  was used for the complete conversation   as well as explanation of the risks, possible complications to her   satisfaction.  Plan is to proceed with laparoscopic cholecystectomy.        ______________________________  MD SOPHY HALE/SAMIA    DD:  05/19/2021 09:16  DT:  05/19/2021 10:50    Job#:  [id number]

## 2021-05-19 NOTE — ED TRIAGE NOTES
Fiona Alcantar  31 y.o.  Female  Chief Complaint   Patient presents with   • Epigastric Pain     brought in  c/o epigastric pain that radiates to back since morning. + N/V/ vomited 4 times PTA. states pain in the back more on lower area. denies painful urination.

## 2021-05-19 NOTE — ED NOTES
Pt ambulated from [facility] to [place] [address] without assistance, tolerated well. Pt placed in hospital gown and is now sitting up in bed talking to  at bedside with even and unlabored breaths, in no apparent distress at this time. Will continue to monitor pt while awaiting orders.

## 2021-05-19 NOTE — OR NURSING
Report given to Marlene CRUZ via SBAR reviewed orders and patient history, no questions at this time.  Pt alert and oriented, resp even and nonlabored, in NAD, pt denies pain/nausea at this time. Pt moves all ext, follows commands and verbalized understanding  of poc and discharge. Family notified via text/phone patient is moving to phase II. Will con't to monitor until patient has transitioned.

## 2021-05-19 NOTE — ED NOTES
Report given to Rubia MONDRAGON RN. Upon shift change pt is resting in bed with even and unlabored breaths, in no apparent distress.

## 2021-05-19 NOTE — ED PROVIDER NOTES
ED Provider Note    Scribed for Mg Serra M.D. by Ehsan Lerner. 2021  3:00 AM    Primary care provider: Pcp Pt States None  Means of arrival: Walk-in  History obtained from: Patient  History limited by: None    CHIEF COMPLAINT  Chief Complaint   Patient presents with   • Epigastric Pain     brought in  c/o epigastric pain that radiates to back since morning. + N/V/ vomited 4 times PTA. states pain in the back more on lower area. denies painful urination.        HPI  Fiona Alcantar is a 31 y.o. female who presents to the Emergency Department for evaluation of acute epigastric pain onset yesterday morning. The pain is sharp and radiates to her upper back. She had had several episodes of vomiting. No fever, vaginal discharge, diarrhea or dysuria. She denies any chance of pregnancy. She has had pain similar to this in the past always worse after fatty foods like pizza, fries or steak.    REVIEW OF SYSTEMS  Pertinent positives include: epigastric pain, back pain, and vomiting. Pertinent negatives include: no fever, vaginal discharge, diarrhea or dysuria. See history of present illness. All other systems are negative.     PAST MEDICAL HISTORY   has a past medical history of Asthma.    SURGICAL HISTORY   has a past surgical history that includes  delivery only (Bilateral, 2020).    SOCIAL HISTORY  Social History     Tobacco Use   • Smoking status: Never Smoker   • Smokeless tobacco: Never Used   Vaping Use   • Vaping Use: Never used   Substance Use Topics   • Alcohol use: Not Currently   • Drug use: Never      Social History     Substance and Sexual Activity   Drug Use Never       FAMILY HISTORY  Family History   Problem Relation Age of Onset   • Kidney Disease Mother    • No Known Problems Father        CURRENT MEDICATIONS  Current Outpatient Medications   Medication Instructions   • acetaminophen (TYLENOL) 1,000 mg, Oral, EVERY 6 HOURS   • docusate sodium 100 mg, Oral, 2  "TIMES DAILY   • ferrous sulfate 325 mg, Oral, 3 TIMES DAILY WITH MEALS   • ibuprofen (MOTRIN) 800 mg, Oral, EVERY 8 HOURS PRN   • Prenatal MV-Min-Fe Fum-FA-DHA (PRENATAL 1 PO) Oral       ALLERGIES  No Known Allergies    PHYSICAL EXAM  VITAL SIGNS: /77   Pulse 64   Temp 36.2 °C (97.1 °F) (Temporal)   Resp 14   Ht 1.448 m (4' 9\")   Wt 63.8 kg (140 lb 10.5 oz)   SpO2 98%   BMI 30.44 kg/m²     Constitutional: Alert in no apparent distress.  HENT: No signs of trauma, Bilateral external ears normal, Nose normal. Uvula midline.   Eyes: Pupils are equal and reactive, Conjunctiva normal, Non-icteric.   Neck: Normal range of motion, No tenderness, Supple, No stridor.   Lymphatic: No lymphadenopathy noted.   Cardiovascular: Regular rate and rhythm, no murmurs.   Thorax & Lungs: Normal breath sounds, No respiratory distress, No wheezing, No chest tenderness.   Abdomen:  Soft, right upper quadrant greater than left upper quadrant tenderness, No peritoneal signs, No masses, No pulsatile masses.   Skin: Warm, Dry, No erythema, No rash.   Back: No bony tenderness, No CVA tenderness.   Extremities: Intact distal pulses, No edema, No tenderness, No cyanosis.  Musculoskeletal: Good range of motion in all major joints. No tenderness to palpation or major deformities noted.   Neurologic: Alert , Normal motor function, Normal sensory function, No focal deficits noted.   Psychiatric: Affect normal, Judgment normal, Mood normal.     DIAGNOSTIC STUDIES / PROCEDURES    LABS  Labs Reviewed   CBC WITH DIFFERENTIAL - Abnormal; Notable for the following components:       Result Value    MCHC 33.5 (*)     All other components within normal limits   COMP METABOLIC PANEL - Abnormal; Notable for the following components:    Glucose 100 (*)     Total Protein 8.5 (*)     Globulin 3.7 (*)     All other components within normal limits   LIPASE   ESTIMATED GFR   SARS-COV ANTIGEN TODD    Narrative:     Have you been in close contact with a " person who is suspected  or known to be positive for COVID-19 within the last 30 days  (e.g. last seen that person < 30 days ago)->No   SARS-COV-2, PCR (IN-HOUSE)    Narrative:     Have you been in close contact with a person who is suspected  or known to be positive for COVID-19 within the last 30 days  (e.g. last seen that person < 30 days ago)->No   HCG QUAL SERUM   URINALYSIS      All labs reviewed by me.    RADIOLOGY  US-RUQ   Final Result         1.  Hepatomegaly   2.  Cholelithiasis with trace pericholecystic fluid but normal gallbladder wall thickness, findings insufficient for diagnosis of cholecystitis.   3.  Pulsatile flow of the main portal vein.   4.  Right extrarenal pelvis morphology or trace hydronephrosis.        The radiologist's interpretation of all radiological studies have been reviewed by me.    COURSE & MEDICAL DECISION MAKING  Nursing notes, VS, PMSFHx reviewed in chart.    31 y.o. female p/w chief complaint of epigastric pain.    3:00 AM Patient seen and examined at bedside. Patient treated with  cocktail and IV fluids.    I verified that the patient was wearing a mask and I was wearing appropriate PPE every time I entered the room. The patient's mask was on the patient at all times during my encounter except for a brief view of the oropharynx.     The differential diagnoses include but are not limited to:   #abdominal pain    US-RUQ obtained  No RLQ pain, TTP or fever to suggest appendicitis  No LLQ pain or TTP or fever to suggest diverticulitis  No pain at of proportion to suggest mesenteric ischemia  No e/o rash or zoster  No e/o UTI  No elevation in lipase to suggest pancreatitis    Patient treated with Rocephin and Flagyl    6:08 AM Paged Surgery.    6:12 AM I discussed the patient's case and the above findings with Dr. Schwartz (Surgery) who agreed to evaluate the patient.    6:28 AM Patient was reevaluated at bedside. Discussed lab and radiology results with the patient and informed  them that she will be taken to surgery due to persistent symptomatic cholelithiasis and exam and imaging concerning for acute cholecysitis.     DISPOSITION:  Patient will be taken to surgery by Dr. Schwartz in guarded condition.    FINAL IMPRESSION  1. Acute cholecystitis          Ehsan HEALY (Bernadette), am scribing for, and in the presence of, Mg Serra M.D..    Electronically signed by: Ehsan Lerner (Krishanibe), 5/19/2021    IMg M.D. personally performed the services described in this documentation, as scribed by Ehsan Lerner in my presence, and it is both accurate and complete.    The note accurately reflects work and decisions made by me.  Mg Serra M.D.  5/19/2021  8:50 AM

## 2021-05-19 NOTE — OR NURSING
Assumed care of pt at 1200, pt arrived in Phase 21 from PACU, resting in recliner chair, VSS, 3 abdominal gauze/transparent film dressings intact with scant red drainage present on gauze, complains of feeling sleepy, minimal pain, denies nausea or SOB, drinking juice and crackers,  at bedside, d/c instructions and RX reviewed with pt and  via IPAD , they verbalized understanding of instructions, PIV removed, tip intact, discharged via w/c to home with  at 1300.

## 2021-05-19 NOTE — ANESTHESIA PREPROCEDURE EVALUATION
Acute appendicitis  Obesity    Physical Exam    Airway   Mallampati: II  TM distance: >3 FB  Neck ROM: full       Cardiovascular - normal exam  Rhythm: regular  Rate: normal  (-) murmur     Dental         Very poor dentition   Pulmonary - normal exam  Breath sounds clear to auscultation     Abdominal    Neurological - normal exam                 Anesthesia Plan    ASA 2       Plan - general       Airway plan will be ETT          Induction: intravenous      Pertinent diagnostic labs and testing reviewed    Informed Consent:    Anesthetic plan and risks discussed with patient.      Professional interpretation service used to obtain informed consent.

## 2021-05-19 NOTE — ED NOTES
Report called to Smita CRUZ in Pre-Op. Will sent COVID swabs and will call lab to add pregnancy test onto sample in lab, per RN. Will continue to monitor pt while awaiting transport arrival.

## 2021-05-19 NOTE — DISCHARGE INSTRUCTIONS
Instrucciones Para [place]  (Home Care Instructions)    ACTIVIDAD: Descanse y tome todo con mucha calma las primeras 24 horas después de cuevas cirugía.  Isis persona adulta responsable debe permanecer con usted keo scotty periodo de tiempo.  Es normal sentirse sonoliento o sonolienta keo esas primeras horas.  Le recomendamos que no gordy nada que requiera equilibrio, arturo decisiones a mucha coordinación de cuevas parte.    NO GORDY ESTO PURANTE LAS PRIMERAS 24 HORAS:   Manejar o conducir algún vehiculo, operar maquinarias o utilizar electrodomesticos.   Beber cerveza o algún otro tipo de bebida alcohólica.   Arturo decisiones importantes o firmar documentos legales.    INSTRUCCIONES ESPECIALES:    Follow up with Dr. Weinberg in 1 week     Colecistectomía laparoscópica, cuidados posteriores  Laparoscopic Cholecystectomy, Care After  Ingrid esta información sobre cómo cuidarse después del procedimiento. El médico también podrá darle indicaciones más específicas. Si tiene problemas o preguntas, llame al médico.  Siga estas indicaciones en cuevas casa:  Cuidado de los mann de la cirugía (incisiones)    · Siga las indicaciones del médico en lo que respecta al cuidado de los mann de la cirugía. Gordy lo siguiente:  ? Lávese las americo con agua y jabón antes de cambiar las vendas (vendaje). Use un desinfectante para americo si no dispone de agua y jabón.  ? Cambie el vendaje abdoulaye se lo haya indicado el médico.  ? No retire los puntos (suturas), la goma para cerrar la piel o las tiras adhesivas. Nash vez deban dejarse puestos en la piel keo 2 semanas o más tiempo. Si las tiras adhesivas se despegan y se enroscan, puede recortar los bordes sueltos. No retire las tiras adhesivas por completo a menos que el médico lo autorice.  · No tome collette de inmersión, no practique natación ni use el jacuzzi hasta que el médico lo autorice. Pregúntele al médico si puede ducharse. Nash vez solo le permitan arturo collette de esponja.  · Controle la  sushma alrededor del sruthi todos los días para detectar signos de infección. Esté atento a los siguientes signos:  ? Aumento del enrojecimiento, de la hinchazón o del dolor.  ? Más líquido o floyd.  ? Calor.  ? Pus o mal olor.  Actividad  · No conduzca ni use maquinaria pesada mientras jayde analgésicos recetados.  · No levante ningún objeto que pese más de 10 libras (4,5 kg) hasta que el médico lo autorice.  · No practique deportes de contacto hasta que el médico lo autorice.  · No conduzca keo 24 horas si le dieron un medicamento para ayudarlo a que se relaje (sedante).  · Descanse todo lo que sea necesario. No retome el trabajo ni el estudio hasta que el médico lo autorice.  Instrucciones generales  · [place]ley los medicamentos de venta stan y los recetados solamente abdoulaye se lo haya indicado el médico.  · A fin de prevenir o tratar el estreñimiento mientras jayde analgésicos recetados, el médico puede recomendarle lo siguiente:  ? Beber suficiente líquido para mantener el pis (orina) rebeca o de color amarillo pálido.  ? Arturo medicamentos recetados o de venta stan.  ? Consumir alimentos ricos en fibra, abdoulaye frutas y verduras frescas, cereales integrales y frijoles.  ? Limitar el consumo de alimentos con alto contenido de grasas y azúcares procesados, abdoulaye alimentos fritos o dulces.  Comuníquese con un médico si:  · Le aparece orlin erupción cutánea.  · Tiene más enrojecimiento, hinchazón o dolor alrededor de los mann de la cirugía.  · Aumenta la cantidad de líquido o floyd que sale de los mann.  · Los mann de la cirugía se sienten calientes al tacto.  · Tiene pus o percibe mal olor que emana de los mann.  · Tiene fiebre.  · Se abren prince o más de los mann.  Solicite ayuda de inmediato si:  · Tiene dificultad para respirar.  · Siente dolor en el pecho.  · Siente dolor que empeora en la sushma de los hombros.  · Se desmaya o se siente mareado al ponerse de pie.  · Tiene dolor muy intenso de vientre  (abdomen).  · Tiene malestar estomacal (náuseas) que dura más de un día.  · Vomita keo más de un día.  · Siente dolor en la pierna.  Esta información no tiene kendrick fin reemplazar el consejo del médico. Asegúrese de hacerle al médico cualquier pregunta que tenga.      DIETA: Para evitar las nauseas, prosiga despacito con cuevas dieta a medida que pueda ir tolerándola mejor, evite comidas muy condimentadas o grasosas keo scotty primer día.  Vaya agregando comidas más substanciadas a cuevas dieta a medida que asi lo indique cuevas médica.  Los bebés pueden beber leche preparada o formula, ásl kendrick también leche del seno de la madre a medida que vayan teniendo hambre.  SIGA AGREGANDO LIQUIDOS Y COMIDAS CON FIBRA PARA EVITAR ESTREÑIMIENTO.    KENDRICK BAÑARSE Y CAMBIAR LOS VENDAJES DE LA CIRUGIA: Quítese los apósitos en 48 horas, luego podrá ducharse, no se sumerja en la bañera, el jacuzzi o la piscina hasta después de cuevas samuel de seguimiento con el médico.    MEDICAMENTOS/MEDICINAS:  Vuelva a lauren daphne medicamentos diarios.  [place]fall los medicamentos que se le prescribe con un poco de comida.  Si no le prescribe ningún tipo de medicamento, entonces puede lauren medicinas para el dolor que no contienen aspirina, si las necesita.  LAS MEDICINAS PARA EL DOLOR PUEDEN ESTREÑIRLE MUCHO.  [place] un suavizante para el excremento o materia fecal (stool softener) o un laxativo kendrick por ejemplo: senokot, pericolase, o leche de magnesia, si lo necesita.    La prescripción la administro -Zofran (nausea) and Oxycodone (pain) sent to [facility] in [place] at [facility]  La ultima sosis de medicina para el dolor fue administrada 11:05am.     Se debe hacer orlin consulta medica con el doctor en 1 semana, Líame para hacer la samuel.    Usted debe LIAMAR A CUEVAS MEDICO si tiene los siguientes síntomas:   -   Orlin fiebre más jeff de 101 grados Fahrenheit.   -   Un dolor incesante aún con los medicamentos, o nauseas y vómito persistente.   -   Un  sangrado excesivo (floyd que traspasa los vendajes o gasas) o algúln tipo de drenaje inesperado que proviene de la henda.     -   Un color leon exagerado o hinchazón alrededor del área en donde se le hizo incisión o sruthi, o un drenaje de pus o con olor yvonne proveniente de la henda.   -    La inhabilidad de orinar o vaciar cuevas vejiga en 8 horas.   -    Problemas con a respiración o josé miguel en el pecho.    Usted debe llamar al 911 si se presentan problemas con el dolor al respirar o el pecho.  Si no se puede ponnoer en comunicación con un medica o con el centro de cirugía, usted debe ir a la estación de emergencia (emergency room) más cercana o a un centro de atención de urgencia (urgent care center).  El teléfono del medico es: [phone] Dr Weinberg    LOS SÍNTOMAS DE UN LEVE RESFRIO SON MUY NORMALES.  ADEMÁS USTED PUEDE LLEGAR A SENTIR JOSÉ MIGUEL GENERALES DE MÚSCULOS, IRRITACIÓN EN LA GARGANTA, JOSÉ MIGUEL DE ZANA Y/O UN POCO DE NAUSEAS.    Sie tiene alguna pregunta, llame a cuevas médico.  Si cuevas médico no se encuentra disponible, por favor llame al Centro de Cirugía at [phone].  el Centro está abierto de Lunes a Viernes desde las 7:00 de la manana hasta las 5:00 de la noche.  Usted también puede llamar al CENTRO DE LLAMADAS SOBRE LA CLARENCE o HEALTH HOTLINE.  Jennie está abierto viente y cuatro horas por saroj, siete peterson por semana, allí podrá hablar con orlin enfermera.  Llame al [phone], o al número airam 5 [phone].    Mi firma a continuación indica que he recibido y entiendco estas instrucciones acera de los cuidados en la casa (Home Care Instructions)    Usted recibirá orlin encuesta en la correspondencia en las siguientes semanas y le pedimos que por favor tome un momento para completar rosendo encuesta y regresaría a hosotros.  Nuestro objetivó es brindarle un cuidado muy galicia y par lo tanto apreciamos daphne coméntanos.  Muchas jenise por magdy escogido el Centro de Cirugía [facility].  ACTIVITY: Rest and take it easy for the first 24 hours.  A responsible adult is recommended to remain with you during that time.  It is normal to feel sleepy.  We encourage you to not do anything that requires balance, judgment or coordination.    MILD FLU-LIKE SYMPTOMS ARE NORMAL. YOU MAY EXPERIENCE GENERALIZED MUSCLE ACHES, THROAT IRRITATION, HEADACHE AND/OR SOME NAUSEA.    FOR 24 HOURS DO NOT:  Drive, operate machinery or run household appliances.  Drink beer or alcoholic beverages.   Make important decisions or sign legal documents.    SPECIAL INSTRUCTIONS: Remove dressings in 48 hours, then you may shower, do not soak in bath tub, hot tub or swimming pool until after your follow up doctor appointment    DIET: To avoid nausea, slowly advance diet as tolerated, avoiding spicy or greasy foods for the first day.  Add more substantial food to your diet according to your physician's instructions. INCREASE FLUIDS AND FIBER TO AVOID CONSTIPATION.    FOLLOW-UP APPOINTMENT:  A follow-up appointment should be arranged with your doctor in 1 week; call to schedule.    You should CALL YOUR PHYSICIAN if you develop:  Fever greater than 101 degrees F.  Pain not relieved by medication, or persistent nausea or vomiting.  Excessive bleeding (blood soaking through dressing) or unexpected drainage from the wound.  Extreme redness or swelling around the incision site, drainage of pus or foul smelling drainage.  Inability to urinate or empty your bladder within 8 hours.  Problems with breathing or chest pain.    You should call 911 if you develop problems with breathing or chest pain.  If you are unable to contact your doctor or surgical center, you should go to the nearest emergency room or urgent care center.    Physician's telephone #: [phone] Dr Weinberg    If any questions arise, call your doctor.  If your doctor is not available, please feel free to call the Surgical Center at [phone]. The Contact  Center is open Monday through Friday 7AM to 5PM and may speak to a nurse at [phone], or toll free at [phone].     A registered nurse may call you a few days after your surgery to see how you are doing after your procedure.    MEDICATIONS: Resume taking daily medication.  Take prescribed pain medication with food.  If no medication is prescribed, you may take non-aspirin pain medication if needed.  PAIN MEDICATION CAN BE VERY CONSTIPATING.  Take a stool softener or laxative such as senokot, pericolace, or milk of magnesia if needed.    Prescription given for Zofran (nausea) and Oxycodone (pain) sent to [facility] in [place] at [facility].  Last pain medication given at 11:05am.    If your physician has prescribed pain medication that includes Acetaminophen (Tylenol), do not take additional Acetaminophen (Tylenol) while taking the prescribed medication.    Depression / Suicide Risk    As you are discharged from this [facility] facility, it is important to learn how to keep safe from harming yourself.    Recognize the warning signs:  · Abrupt changes in personality, positive or negative- including increase in energy   · Giving away possessions  · Change in eating patterns- significant weight changes-  positive or negative  · Change in sleeping patterns- unable to sleep or sleeping all the time   · Unwillingness or inability to communicate  · Depression  · Unusual sadness, discouragement and loneliness  · Talk of wanting to die  · Neglect of personal appearance   · Rebelliousness- reckless behavior  · Withdrawal from people/activities they love  · Confusion- inability to concentrate     If you or a loved one observes any of these behaviors or has concerns about self-harm, here's what you can do:  · Talk about it- your feelings and reasons for harming yourself  · Remove any means that you might use to hurt yourself (examples: pills, rope, extension cords, firearm)  · Get professional  help from the community (Mental Health, Substance Abuse, psychological counseling)  · Do not be alone:Call your Safe Contact- someone whom you trust who will be there for you.  · Call your local CRISIS HOTLINE 984-5959 or 936-443-2619  · Call your local Children's Mobile Crisis Response Team [place] (841) 458-3699 or www.Snabboteket  · Call the toll free National Suicide Prevention Hotlines   · National Suicide Prevention Lifeline 484-269-DWBI (6373)  National Hope Line Network 800-SUICIDE (485-1963)

## 2021-05-19 NOTE — ANESTHESIA TIME REPORT
Anesthesia Start and Stop Event Times     Date Time Event    5/19/2021 0846 Ready for Procedure     0950 Anesthesia Start     1059 Anesthesia Stop        Responsible Staff  05/19/21    Name Role Begin End    Ottoniel Snider M.D. Anesth 0950 1059        Preop Diagnosis (Free Text):  Pre-op Diagnosis     Acute cholecystitis        Preop Diagnosis (Codes):    Post op Diagnosis  Acute cholecystitis      Premium Reason  Non-Premium    Comments:

## 2021-05-20 NOTE — ANESTHESIA POSTPROCEDURE EVALUATION
Patient: Fiona Alcantar    Procedure Summary     Date: 05/19/21 Room / Location: [place] [address] / SURGERY [facility]    Anesthesia Start: 0950 Anesthesia Stop: 1059    Procedure: CHOLECYSTECTOMY, LAPAROSCOPIC (N/A Abdomen) Diagnosis: (Acute cholecystitis)    Surgeons: Stewart Weinberg M.D. Responsible Provider: Ottoniel Snider M.D.    Anesthesia Type: general ASA Status: 2          Final Anesthesia Type: general  Last vitals  BP   Blood Pressure: 115/66    Temp   36.7 °C (98 °F)    Pulse   74   Resp   13    SpO2   95 %      Anesthesia Post Evaluation    Patient location during evaluation: PACU  Patient participation: complete - patient participated  Level of consciousness: awake and alert    Airway patency: patent  Anesthetic complications: no  Cardiovascular status: hemodynamically stable  Respiratory status: acceptable  Hydration status: euvolemic    PONV: none          No complications documented.     Nurse Pain Score: 0 (NPRS)

## 2022-11-30 ENCOUNTER — APPOINTMENT (OUTPATIENT)
Dept: RADIOLOGY | Facility: MEDICAL CENTER | Age: 32
DRG: 177 | End: 2022-11-30
Attending: EMERGENCY MEDICINE
Payer: MEDICAID

## 2022-11-30 ENCOUNTER — HOSPITAL ENCOUNTER (INPATIENT)
Facility: MEDICAL CENTER | Age: 32
LOS: 1 days | DRG: 177 | End: 2022-12-01
Attending: EMERGENCY MEDICINE | Admitting: INTERNAL MEDICINE
Payer: MEDICAID

## 2022-11-30 DIAGNOSIS — R09.02 HYPOXIA: ICD-10-CM

## 2022-11-30 DIAGNOSIS — U07.1 COVID: ICD-10-CM

## 2022-11-30 PROBLEM — J96.01 ACUTE RESPIRATORY FAILURE WITH HYPOXIA (HCC): Status: ACTIVE | Noted: 2022-11-30

## 2022-11-30 LAB
ANION GAP SERPL CALC-SCNC: 10 MMOL/L (ref 7–16)
BASOPHILS # BLD AUTO: 0.4 % (ref 0–1.8)
BASOPHILS # BLD: 0.05 K/UL (ref 0–0.12)
BUN SERPL-MCNC: 11 MG/DL (ref 8–22)
CALCIUM SERPL-MCNC: 9.2 MG/DL (ref 8.5–10.5)
CHLORIDE SERPL-SCNC: 106 MMOL/L (ref 96–112)
CO2 SERPL-SCNC: 22 MMOL/L (ref 20–33)
CREAT SERPL-MCNC: 0.45 MG/DL (ref 0.5–1.4)
CRP SERPL HS-MCNC: <0.3 MG/DL (ref 0–0.75)
EKG IMPRESSION: NORMAL
EOSINOPHIL # BLD AUTO: 0.44 K/UL (ref 0–0.51)
EOSINOPHIL NFR BLD: 3.6 % (ref 0–6.9)
ERYTHROCYTE [DISTWIDTH] IN BLOOD BY AUTOMATED COUNT: 43.1 FL (ref 35.9–50)
FLUAV RNA SPEC QL NAA+PROBE: NEGATIVE
FLUBV RNA SPEC QL NAA+PROBE: NEGATIVE
GFR SERPLBLD CREATININE-BSD FMLA CKD-EPI: 131 ML/MIN/1.73 M 2
GLUCOSE SERPL-MCNC: 88 MG/DL (ref 65–99)
HCT VFR BLD AUTO: 39.8 % (ref 37–47)
HGB BLD-MCNC: 13.6 G/DL (ref 12–16)
IMM GRANULOCYTES # BLD AUTO: 0.05 K/UL (ref 0–0.11)
IMM GRANULOCYTES NFR BLD AUTO: 0.4 % (ref 0–0.9)
LYMPHOCYTES # BLD AUTO: 3.78 K/UL (ref 1–4.8)
LYMPHOCYTES NFR BLD: 31.3 % (ref 22–41)
MAGNESIUM SERPL-MCNC: 1.8 MG/DL (ref 1.5–2.5)
MCH RBC QN AUTO: 31.6 PG (ref 27–33)
MCHC RBC AUTO-ENTMCNC: 34.2 G/DL (ref 33.6–35)
MCV RBC AUTO: 92.6 FL (ref 81.4–97.8)
MONOCYTES # BLD AUTO: 0.8 K/UL (ref 0–0.85)
MONOCYTES NFR BLD AUTO: 6.6 % (ref 0–13.4)
NEUTROPHILS # BLD AUTO: 6.95 K/UL (ref 2–7.15)
NEUTROPHILS NFR BLD: 57.7 % (ref 44–72)
NRBC # BLD AUTO: 0 K/UL
NRBC BLD-RTO: 0 /100 WBC
PHOSPHATE SERPL-MCNC: 3.4 MG/DL (ref 2.5–4.5)
PLATELET # BLD AUTO: 443 K/UL (ref 164–446)
PMV BLD AUTO: 10.8 FL (ref 9–12.9)
POTASSIUM SERPL-SCNC: 3.8 MMOL/L (ref 3.6–5.5)
PROCALCITONIN SERPL-MCNC: <0.05 NG/ML
RBC # BLD AUTO: 4.3 M/UL (ref 4.2–5.4)
RSV RNA SPEC QL NAA+PROBE: NEGATIVE
SARS-COV-2 RNA RESP QL NAA+PROBE: DETECTED
SODIUM SERPL-SCNC: 138 MMOL/L (ref 135–145)
SPECIMEN SOURCE: ABNORMAL
WBC # BLD AUTO: 12.1 K/UL (ref 4.8–10.8)

## 2022-11-30 PROCEDURE — 700101 HCHG RX REV CODE 250: Performed by: INTERNAL MEDICINE

## 2022-11-30 PROCEDURE — 70360 X-RAY EXAM OF NECK: CPT

## 2022-11-30 PROCEDURE — 700101 HCHG RX REV CODE 250: Performed by: EMERGENCY MEDICINE

## 2022-11-30 PROCEDURE — 84100 ASSAY OF PHOSPHORUS: CPT

## 2022-11-30 PROCEDURE — 0241U HCHG SARS-COV-2 COVID-19 NFCT DS RESP RNA 4 TRGT MIC: CPT

## 2022-11-30 PROCEDURE — 80048 BASIC METABOLIC PNL TOTAL CA: CPT

## 2022-11-30 PROCEDURE — 700111 HCHG RX REV CODE 636 W/ 250 OVERRIDE (IP): Performed by: EMERGENCY MEDICINE

## 2022-11-30 PROCEDURE — 71045 X-RAY EXAM CHEST 1 VIEW: CPT

## 2022-11-30 PROCEDURE — 83735 ASSAY OF MAGNESIUM: CPT

## 2022-11-30 PROCEDURE — 36415 COLL VENOUS BLD VENIPUNCTURE: CPT

## 2022-11-30 PROCEDURE — 99285 EMERGENCY DEPT VISIT HI MDM: CPT

## 2022-11-30 PROCEDURE — 96372 THER/PROPH/DIAG INJ SC/IM: CPT

## 2022-11-30 PROCEDURE — 93005 ELECTROCARDIOGRAM TRACING: CPT | Performed by: EMERGENCY MEDICINE

## 2022-11-30 PROCEDURE — 99223 1ST HOSP IP/OBS HIGH 75: CPT | Performed by: INTERNAL MEDICINE

## 2022-11-30 PROCEDURE — 84145 PROCALCITONIN (PCT): CPT

## 2022-11-30 PROCEDURE — 700111 HCHG RX REV CODE 636 W/ 250 OVERRIDE (IP): Performed by: INTERNAL MEDICINE

## 2022-11-30 PROCEDURE — 85025 COMPLETE CBC W/AUTO DIFF WBC: CPT

## 2022-11-30 PROCEDURE — 8E0ZXY6 ISOLATION: ICD-10-PCS | Performed by: INTERNAL MEDICINE

## 2022-11-30 PROCEDURE — 94640 AIRWAY INHALATION TREATMENT: CPT

## 2022-11-30 PROCEDURE — A9270 NON-COVERED ITEM OR SERVICE: HCPCS | Performed by: INTERNAL MEDICINE

## 2022-11-30 PROCEDURE — 86140 C-REACTIVE PROTEIN: CPT

## 2022-11-30 PROCEDURE — C9803 HOPD COVID-19 SPEC COLLECT: HCPCS | Performed by: EMERGENCY MEDICINE

## 2022-11-30 PROCEDURE — 770001 HCHG ROOM/CARE - MED/SURG/GYN PRIV*

## 2022-11-30 PROCEDURE — 700102 HCHG RX REV CODE 250 W/ 637 OVERRIDE(OP): Performed by: INTERNAL MEDICINE

## 2022-11-30 RX ORDER — BISACODYL 10 MG
10 SUPPOSITORY, RECTAL RECTAL
Status: DISCONTINUED | OUTPATIENT
Start: 2022-11-30 | End: 2022-12-01 | Stop reason: HOSPADM

## 2022-11-30 RX ORDER — POLYETHYLENE GLYCOL 3350 17 G/17G
1 POWDER, FOR SOLUTION ORAL
Status: DISCONTINUED | OUTPATIENT
Start: 2022-11-30 | End: 2022-12-01 | Stop reason: HOSPADM

## 2022-11-30 RX ORDER — PROCHLORPERAZINE EDISYLATE 5 MG/ML
5-10 INJECTION INTRAMUSCULAR; INTRAVENOUS EVERY 4 HOURS PRN
Status: DISCONTINUED | OUTPATIENT
Start: 2022-11-30 | End: 2022-12-01 | Stop reason: HOSPADM

## 2022-11-30 RX ORDER — PROMETHAZINE HYDROCHLORIDE 25 MG/1
12.5-25 SUPPOSITORY RECTAL EVERY 4 HOURS PRN
Status: DISCONTINUED | OUTPATIENT
Start: 2022-11-30 | End: 2022-12-01 | Stop reason: HOSPADM

## 2022-11-30 RX ORDER — PROMETHAZINE HYDROCHLORIDE 25 MG/1
12.5-25 TABLET ORAL EVERY 4 HOURS PRN
Status: DISCONTINUED | OUTPATIENT
Start: 2022-11-30 | End: 2022-12-01 | Stop reason: HOSPADM

## 2022-11-30 RX ORDER — ACETAMINOPHEN 325 MG/1
650 TABLET ORAL EVERY 6 HOURS PRN
Status: DISCONTINUED | OUTPATIENT
Start: 2022-11-30 | End: 2022-12-01 | Stop reason: HOSPADM

## 2022-11-30 RX ORDER — DEXAMETHASONE SODIUM PHOSPHATE 10 MG/ML
5 INJECTION, SOLUTION INTRAMUSCULAR; INTRAVENOUS ONCE
Status: COMPLETED | OUTPATIENT
Start: 2022-11-30 | End: 2022-11-30

## 2022-11-30 RX ORDER — ONDANSETRON 4 MG/1
4 TABLET, ORALLY DISINTEGRATING ORAL EVERY 4 HOURS PRN
Status: DISCONTINUED | OUTPATIENT
Start: 2022-11-30 | End: 2022-12-01 | Stop reason: HOSPADM

## 2022-11-30 RX ORDER — DEXAMETHASONE 6 MG/1
6 TABLET ORAL DAILY
Status: DISCONTINUED | OUTPATIENT
Start: 2022-12-01 | End: 2022-12-01 | Stop reason: HOSPADM

## 2022-11-30 RX ORDER — GUAIFENESIN/DEXTROMETHORPHAN 100-10MG/5
10 SYRUP ORAL EVERY 6 HOURS PRN
Status: DISCONTINUED | OUTPATIENT
Start: 2022-11-30 | End: 2022-12-01 | Stop reason: HOSPADM

## 2022-11-30 RX ORDER — ACETAMINOPHEN 160 MG/5ML
320 SUSPENSION ORAL EVERY 6 HOURS PRN
COMMUNITY

## 2022-11-30 RX ORDER — ONDANSETRON 2 MG/ML
4 INJECTION INTRAMUSCULAR; INTRAVENOUS EVERY 4 HOURS PRN
Status: DISCONTINUED | OUTPATIENT
Start: 2022-11-30 | End: 2022-12-01 | Stop reason: HOSPADM

## 2022-11-30 RX ORDER — IPRATROPIUM BROMIDE AND ALBUTEROL SULFATE 2.5; .5 MG/3ML; MG/3ML
3 SOLUTION RESPIRATORY (INHALATION)
Status: DISPENSED | OUTPATIENT
Start: 2022-11-30 | End: 2022-12-01

## 2022-11-30 RX ORDER — AMOXICILLIN 250 MG
2 CAPSULE ORAL 2 TIMES DAILY
Status: DISCONTINUED | OUTPATIENT
Start: 2022-11-30 | End: 2022-12-01 | Stop reason: HOSPADM

## 2022-11-30 RX ORDER — LABETALOL HYDROCHLORIDE 5 MG/ML
10 INJECTION, SOLUTION INTRAVENOUS EVERY 4 HOURS PRN
Status: DISCONTINUED | OUTPATIENT
Start: 2022-11-30 | End: 2022-12-01 | Stop reason: HOSPADM

## 2022-11-30 RX ORDER — ENOXAPARIN SODIUM 100 MG/ML
40 INJECTION SUBCUTANEOUS DAILY
Status: DISCONTINUED | OUTPATIENT
Start: 2022-11-30 | End: 2022-12-01 | Stop reason: HOSPADM

## 2022-11-30 RX ADMIN — GUAIFENESIN AND DEXTROMETHORPHAN 10 ML: 100; 10 SYRUP ORAL at 20:42

## 2022-11-30 RX ADMIN — ENOXAPARIN SODIUM 40 MG: 40 INJECTION SUBCUTANEOUS at 06:16

## 2022-11-30 RX ADMIN — IPRATROPIUM BROMIDE AND ALBUTEROL SULFATE 3 ML: .5; 2.5 SOLUTION RESPIRATORY (INHALATION) at 14:04

## 2022-11-30 RX ADMIN — DEXAMETHASONE SODIUM PHOSPHATE 5 MG: 10 INJECTION INTRAMUSCULAR; INTRAVENOUS at 02:39

## 2022-11-30 RX ADMIN — IPRATROPIUM BROMIDE AND ALBUTEROL SULFATE 3 ML: .5; 2.5 SOLUTION RESPIRATORY (INHALATION) at 07:15

## 2022-11-30 RX ADMIN — ALBUTEROL SULFATE 10 MG/HR: 2.5 SOLUTION RESPIRATORY (INHALATION) at 02:32

## 2022-11-30 RX ADMIN — IPRATROPIUM BROMIDE AND ALBUTEROL SULFATE 3 ML: .5; 2.5 SOLUTION RESPIRATORY (INHALATION) at 10:09

## 2022-11-30 RX ADMIN — ENOXAPARIN SODIUM 40 MG: 40 INJECTION SUBCUTANEOUS at 20:13

## 2022-11-30 RX ADMIN — DOCUSATE SODIUM 50 MG AND SENNOSIDES 8.6 MG 2 TABLET: 8.6; 5 TABLET, FILM COATED ORAL at 06:16

## 2022-11-30 RX ADMIN — ACETAMINOPHEN 650 MG: 325 TABLET, FILM COATED ORAL at 20:42

## 2022-11-30 RX ADMIN — IPRATROPIUM BROMIDE AND ALBUTEROL SULFATE 3 ML: .5; 2.5 SOLUTION RESPIRATORY (INHALATION) at 19:11

## 2022-11-30 ASSESSMENT — ENCOUNTER SYMPTOMS
COUGH: 1
HALLUCINATIONS: 0
HEMOPTYSIS: 0
POLYDIPSIA: 0
TREMORS: 0
FEVER: 0
PHOTOPHOBIA: 0
VOMITING: 0
FLANK PAIN: 0
ORTHOPNEA: 0
NAUSEA: 0
BLURRED VISION: 0
BRUISES/BLEEDS EASILY: 0
FOCAL WEAKNESS: 0
SHORTNESS OF BREATH: 1
HEADACHES: 0
PALPITATIONS: 0
WEIGHT LOSS: 0
CHILLS: 1
SPUTUM PRODUCTION: 1
NERVOUS/ANXIOUS: 0
DOUBLE VISION: 0
WHEEZING: 1
HEARTBURN: 0
NECK PAIN: 0
BACK PAIN: 0
SPEECH CHANGE: 0

## 2022-11-30 ASSESSMENT — COGNITIVE AND FUNCTIONAL STATUS - GENERAL
MOBILITY SCORE: 24
DAILY ACTIVITIY SCORE: 24
SUGGESTED CMS G CODE MODIFIER DAILY ACTIVITY: CH
SUGGESTED CMS G CODE MODIFIER MOBILITY: CH

## 2022-11-30 ASSESSMENT — PAIN DESCRIPTION - PAIN TYPE
TYPE: ACUTE PAIN

## 2022-11-30 ASSESSMENT — COPD QUESTIONNAIRES
COPD SCREENING SCORE: 0
DO YOU EVER COUGH UP ANY MUCUS OR PHLEGM?: NO/ONLY WITH OCCASIONAL COLDS OR INFECTIONS
HAVE YOU SMOKED AT LEAST 100 CIGARETTES IN YOUR ENTIRE LIFE: NO/DON'T KNOW
DURING THE PAST 4 WEEKS HOW MUCH DID YOU FEEL SHORT OF BREATH: NONE/LITTLE OF THE TIME

## 2022-11-30 ASSESSMENT — PATIENT HEALTH QUESTIONNAIRE - PHQ9
1. LITTLE INTEREST OR PLEASURE IN DOING THINGS: NOT AT ALL
SUM OF ALL RESPONSES TO PHQ9 QUESTIONS 1 AND 2: 0

## 2022-11-30 ASSESSMENT — LIFESTYLE VARIABLES: SUBSTANCE_ABUSE: 0

## 2022-11-30 ASSESSMENT — FIBROSIS 4 INDEX: FIB4 SCORE: 0.26

## 2022-11-30 NOTE — PROGRESS NOTES
Hospital Medicine Daily Progress Note    Date of Service  11/30/2022    Chief Complaint  Fiona Alcantar is a 32 y.o. female admitted 11/30/2022 with SOB    Hospital Course  33 yo woman with asthma who presented with SOB, cough, chills. She is positive for COVID with hypoxia.     Interval Problem Update  Admitted after midnight  Her SOB is better. On 2L O2    I have discussed this patient's plan of care and discharge plan at IDT rounds today with Case Management, Nursing, Nursing leadership, and other members of the IDT team.    Consultants/Specialty  none    Code Status  Full Code    Disposition  Patient is not medically cleared for discharge.   Anticipate discharge to to home with close outpatient follow-up.  I have placed the appropriate orders for post-discharge needs.    Review of Systems  Review of Systems   Constitutional:  Positive for malaise/fatigue.   Respiratory:  Positive for cough and shortness of breath.    Cardiovascular:  Negative for chest pain.      Physical Exam  Temp:  [36.7 °C (98 °F)-36.9 °C (98.4 °F)] 36.9 °C (98.4 °F)  Pulse:  [] 87  Resp:  [16-32] 32  BP: (100-130)/(54-82) 104/70  SpO2:  [90 %-99 %] 93 %    Physical Exam  Vitals and nursing note reviewed.   Constitutional:       General: She is not in acute distress.     Appearance: She is not toxic-appearing.   HENT:      Head: Normocephalic.      Mouth/Throat:      Mouth: Mucous membranes are moist.   Eyes:      General:         Right eye: No discharge.         Left eye: No discharge.   Cardiovascular:      Rate and Rhythm: Normal rate and regular rhythm.   Pulmonary:      Effort: Respiratory distress present.      Breath sounds: Rales present. No wheezing.   Abdominal:      Palpations: Abdomen is soft.      Tenderness: There is no abdominal tenderness.   Musculoskeletal:         General: No swelling.      Cervical back: Neck supple.   Skin:     General: Skin is warm and dry.   Neurological:      Mental Status: She  is alert and oriented to person, place, and time.       Fluids  No intake or output data in the 24 hours ending 11/30/22 0747    Laboratory  Recent Labs     11/30/22  0158   WBC 12.1*   RBC 4.30   HEMOGLOBIN 13.6   HEMATOCRIT 39.8   MCV 92.6   MCH 31.6   MCHC 34.2   RDW 43.1   PLATELETCT 443   MPV 10.8     Recent Labs     11/30/22  0158   SODIUM 138   POTASSIUM 3.8   CHLORIDE 106   CO2 22   GLUCOSE 88   BUN 11   CREATININE 0.45*   CALCIUM 9.2                   Imaging  DX-NECK FOR SOFT TISSUE   Final Result         1.  Unremarkable soft tissue examination of the neck.      DX-CHEST-PORTABLE (1 VIEW)   Final Result         1.  No acute cardiopulmonary disease.           Assessment/Plan  Acute respiratory failure with hypoxia (HCC)  Assessment & Plan  Hypoxia secondary to COVID-19 infection and asthma exacerbation.  SPO2 88% on room air at rest.  Chest x-ray negative for consolidation.  CBC and CMP pending  Plan: Decadron 6 mg once a day  DuoNeb every 4 hours  RT protocol, pulse oximetry, incentive spirometry, supplemental oxygen  Wean off oxygen as tolerated  Contact, droplet, eye protection  Will check procalcitonin, CRP  If unable to wean off oxygen, consider CTA of pulmonary artery to rule out PE         VTE prophylaxis: enoxaparin ppx    I have performed a physical exam and reviewed and updated ROS and Plan today (11/30/2022). In review of yesterday's note (11/29/2022), there are no changes except as documented above.

## 2022-11-30 NOTE — ASSESSMENT & PLAN NOTE
Hypoxia secondary to COVID-19 infection and asthma exacerbation.  SPO2 88% on room air at rest.  Chest x-ray negative for consolidation.  CBC and CMP pending  Plan: Decadron 6 mg once a day  DuoNeb every 4 hours  RT protocol, pulse oximetry, incentive spirometry, supplemental oxygen  Wean off oxygen as tolerated  Contact, droplet, eye protection  Will check procalcitonin, CRP  If unable to wean off oxygen, consider CTA of pulmonary artery to rule out PE

## 2022-11-30 NOTE — ED PROVIDER NOTES
ED Provider Note    CHIEF COMPLAINT  Chief Complaint   Patient presents with    Difficulty Breathing       HPI  Fiona Alcantar is a 32 y.o. female who presents to the emerged part with increased difficulty breathing over the last couple of hours.  Past medical history significant for asthma.  She states that she was diagnosed with asthma greater than 4 years ago but has not had any significant difficulties from asthma or any treatment for this and [place] has been in the [place] over the last 4 years.  Again this afternoon with the above difficulty breathing.  She is unaware of any known allergies.  No different foods or drinks.  Tolerating secretions.  Decreased phonation.    REVIEW OF SYSTEMS  See HPI for further details. All other systems are negative.     PAST MEDICAL HISTORY   has a past medical history of Asthma.    SOCIAL HISTORY  Social History     Tobacco Use    Smoking status: Never    Smokeless tobacco: Never   Vaping Use    Vaping Use: Never used   Substance and Sexual Activity    Alcohol use: Not Currently    Drug use: Never    Sexual activity: Yes     Partners: Male     Comment: None        SURGICAL HISTORY   has a past surgical history that includes  delivery only (Bilateral, 2020) and josey by laparoscopy (N/A, 2021).    CURRENT MEDICATIONS  Home Medications       Reviewed by Makenzie Perkins R.N. (Registered Nurse) on 22 at 0146  Med List Status: Not Addressed     Medication Last Dose Status   docusate sodium 100 MG Cap  Active   ferrous sulfate 325 (65 Fe) MG tablet  Active   ibuprofen (MOTRIN) 800 MG Tab  Active                    ALLERGIES  No Known Allergies    PHYSICAL EXAM  VITAL SIGNS: /54   Pulse (!) 111   Temp 36.9 °C (98.4 °F) (Temporal)   Resp 20   Ht 1.524 m (5')   Wt 64.4 kg (141 lb 15.6 oz)   SpO2 90%   BMI 27.73 kg/m²  @CANDIS[[id number]::@  Pulse ox interpretation: I interpret this pulse ox as normal.  Constitutional: Alert in no apparent  distress.  HENT: Normocephalic, Atraumatic, Bilateral external ears normal. Nose normal.   Posterior pharynx: Slight posterior pharynx soft tissue enlargement.  Tolerating secretions.  Decreased phonation.  Tongue and uvula are midline without edema or deviation  Eyes: Pupils are equal and reactive.   Heart: Regular rate and rythm, no murmurs.    Lungs: Clear to auscultation bilaterally.  Skin: Warm, Dry, No erythema, No rash.   Neurologic: Alert, Grossly non-focal.   Psychiatric: Affect normal, Judgment normal, Mood normal, Appears appropriate and not intoxicated.     Results for orders placed or performed during the hospital encounter of 22   COV-2, FLU A/B, AND RSV BY PCR (2-4 HOURS CEPHEID): Collect NP swab in VTM    Specimen: Nasopharyngeal; Respirate   Result Value Ref Range    Influenza virus A RNA Negative Negative    Influenza virus B, PCR Negative Negative    RSV, PCR Negative Negative    SARS-CoV-2 by PCR DETECTED (AA)     SARS-CoV-2 Source NP Swab    EKG (NOW)   Result Value Ref Range    Report       [facility] Emergency Dept[facility]    Test Date:  2022  Pt Name:    ELSA ESCOBAR     Department: ER  MRN:        [id number]                      Room:  Gender:     Female                       Technician: [id number]  :        1990                   Requested By:ER TRIAGE PROTOCOL  Order #:    [id number]                    Reading MD:    Measurements  Intervals                                Axis  Rate:       102                          P:          41  OK:         150                          QRS:        -1  QRSD:       89                           T:          0  QT:         358  QTc:        467    Interpretive Statements  Sinus tachycardia  Borderline T abnormalities, anterior leads  No previous ECG available for comparison       DX-NECK FOR SOFT TISSUE   Final Result         1.  Unremarkable soft tissue examination of the neck.      DX-CHEST-PORTABLE (1 VIEW)   Final  Result         1.  No acute cardiopulmonary disease.              COURSE & MEDICAL DECISION MAKING  Pertinent Labs & Imaging studies reviewed. (See chart for details)    32-year-old female presenting to the emergency department with the above presentation.  Patient seemingly having a viral syndrome.  Chest x-ray unremarkable.  X-ray of the soft tissue neck also largely unremarkable consistent with initial clinical exam.  COVID test is positive fitting clinical suspicions.  Patient however during her observation time and work-up has redemonstrated recurrent hypoxia to at least 88%.  For this reason she has been placed on nasal cannula oxygen.  I discussed the case with the hospitalist which is agreeable to ongoing inpatient COVID care.  She has been provided with steroids.  For observation/admission the patient will get additional CBC and chemistry panel per recommendation of the hospitalist.      FINAL IMPRESSION  1. COVID    2. Hypoxia               Electronically signed by: Derek Calvin M.D., 11/30/2022 2:18 AM

## 2022-11-30 NOTE — ED NOTES
PT IS RESTING IN BED. BREATHING IS EVEN AND UNLABORED- LUNGS SOUND CLEAR AND EQUAL, SKIN IS WARM AND DRY, VSS, NAD, WILL CONTINUE TO MONITOR.

## 2022-11-30 NOTE — H&P
Hospital Medicine History & Physical Note    Date of Service  11/30/2022    Primary Care Physician  Pcp Pt States None        Code Status  Full Code    Chief Complaint  Chief Complaint   Patient presents with    Difficulty Breathing       History of Presenting Illness  Fiona Alcantar is a 32 y.o. female with past medical history of asthma who presented 11/30/2022 with complaints of shortness of breath, wheezing, cough with brown sputum, chills, malaise, chest discomfort with Cough.  She denied sick contact.  Upon evaluation, tested positive for COVID-19.  Chest x-ray negative for acute process.  X-ray of the neck was done due to subjective feeling of tightness in the neck, negative for acute abnormalities.  Patient noted to be mildly hypoxic with SPO2 88% on room air and was placed on 1 L of oxygen.  ERP requested admission  CBC and CMP just ordered.    I discussed the plan of care with patient.    Review of Systems  Review of Systems   Constitutional:  Positive for chills and malaise/fatigue. Negative for fever and weight loss.   HENT:  Positive for congestion. Negative for ear pain, hearing loss and tinnitus.    Eyes:  Negative for blurred vision, double vision and photophobia.   Respiratory:  Positive for cough, sputum production, shortness of breath and wheezing. Negative for hemoptysis.    Cardiovascular:  Positive for chest pain (With cough). Negative for palpitations and orthopnea.   Gastrointestinal:  Negative for heartburn, nausea and vomiting.   Genitourinary:  Negative for dysuria, flank pain, frequency and hematuria.   Musculoskeletal:  Negative for back pain, joint pain and neck pain.   Skin:  Negative for itching and rash.   Neurological:  Negative for tremors, speech change, focal weakness and headaches.   Endo/Heme/Allergies:  Negative for environmental allergies and polydipsia. Does not bruise/bleed easily.   Psychiatric/Behavioral:  Negative for hallucinations and substance abuse.  The patient is not nervous/anxious.      Past Medical History   has a past medical history of Asthma.    Surgical History   has a past surgical history that includes pr  delivery only (Bilateral, 2020) and josey by laparoscopy (N/A, 2021).     Family History  family history includes Kidney Disease in her mother; No Known Problems in her father.   Family history reviewed with patient. There is no family history that is pertinent to the chief complaint.     Social History   reports that she has never smoked. She has never used smokeless tobacco. She reports that she does not currently use alcohol. She reports that she does not use drugs.    Allergies  No Known Allergies    Medications  Prior to Admission Medications   Prescriptions Last Dose Informant Patient Reported? Taking?   docusate sodium 100 MG Cap  Patient No No   Sig: Take 100 mg by mouth 2 Times a Day.   Patient not taking: Reported on 2021   ferrous sulfate 325 (65 Fe) MG tablet  Patient No No   Sig: Take 1 Tab by mouth 3 times a day, with meals.   Patient not taking: Reported on 2021   ibuprofen (MOTRIN) 800 MG Tab  Patient No No   Sig: Take 1 Tab by mouth every 8 hours as needed.   Patient not taking: Reported on 2021      Facility-Administered Medications: None       Physical Exam  Temp:  [36.7 °C (98 °F)-36.9 °C (98.4 °F)] 36.9 °C (98.4 °F)  Pulse:  [] 111  Resp:  [18-22] 20  BP: (100-130)/(54-82) 100/54  SpO2:  [90 %-99 %] 90 %  Blood Pressure: 100/54   Temperature: 36.9 °C (98.4 °F)   Pulse: (!) 111   Respiration: 20   Pulse Oximetry: 90 %       Physical Exam  Vitals and nursing note reviewed.   Constitutional:       General: She is not in acute distress.     Appearance: She is ill-appearing.   HENT:      Head: Normocephalic and atraumatic.      Nose: Nose normal.      Mouth/Throat:      Mouth: Mucous membranes are moist.   Eyes:      Extraocular Movements: Extraocular movements intact.      Pupils: Pupils are  equal, round, and reactive to light.   Cardiovascular:      Rate and Rhythm: Regular rhythm. Tachycardia present.   Pulmonary:      Effort: Pulmonary effort is normal.      Breath sounds: Decreased breath sounds and wheezing present.   Abdominal:      General: Abdomen is flat. There is no distension.      Tenderness: There is no abdominal tenderness.   Musculoskeletal:         General: No swelling or deformity. Normal range of motion.      Cervical back: Normal range of motion and neck supple.   Skin:     General: Skin is warm and dry.   Neurological:      General: No focal deficit present.      Mental Status: She is alert and oriented to person, place, and time.   Psychiatric:         Mood and Affect: Mood normal.         Behavior: Behavior normal.       Laboratory:          No results for input(s): ALTSGPT, ASTSGOT, ALKPHOSPHAT, TBILIRUBIN, DBILIRUBIN, GAMMAGT, AMYLASE, LIPASE, ALB, PREALBUMIN, GLUCOSE in the last 72 hours.      No results for input(s): NTPROBNP in the last 72 hours.      No results for input(s): TROPONINT in the last 72 hours.    Imaging:  DX-NECK FOR SOFT TISSUE   Final Result         1.  Unremarkable soft tissue examination of the neck.      DX-CHEST-PORTABLE (1 VIEW)   Final Result         1.  No acute cardiopulmonary disease.          X-Ray:  I have personally reviewed the images and compared with prior images.    Assessment/Plan:  Justification for Admission Status  I anticipate this patient will require at least two midnights for appropriate medical management, necessitating inpatient admission because acute respiratory failure with hypoxia    Patient will need a  bed on EMERGENCY service .  The need is secondary to acute respiratory failure with hypoxia.    Acute respiratory failure with hypoxia (HCC)  COVID-19 infection  Asthma exacerbation  Assessment & Plan  Hypoxia secondary to COVID-19 infection and asthma exacerbation.  SPO2 88% on room air at rest.  Chest x-ray negative for  consolidation.  CBC and CMP pending  Plan: Decadron 6 mg once a day  DuoNeb every 4 hours  RT protocol, pulse oximetry, incentive spirometry, supplemental oxygen  Wean off oxygen as tolerated  Contact, droplet, eye protection  Will check procalcitonin, CRP  If unable to wean off oxygen, consider CTA of pulmonary artery to rule out PE      VTE prophylaxis: enoxaparin ppx

## 2022-11-30 NOTE — PROGRESS NOTES
Pt arrived to yellow 66. On 2LNC, denies any pain. No nausea. A&Ox4. Call light within reach. Will continue to monitor hourly.

## 2022-11-30 NOTE — ED NOTES
Pharmacy Medication Reconciliation      ~Medication reconciliation updated and complete per patient at bedside with  use: [facility] ID#[id number]  ~Allergies have been verified  ~No oral ABX within the last 30 days  ~Patient home pharmacy :  [facility]

## 2022-11-30 NOTE — ED NOTES
Pt resting in [facility].  No needs voiced at this time.  Pt denies complaints at this time.   RT removed pt from 02, pt 90-91% on RA.

## 2022-11-30 NOTE — ED TRIAGE NOTES
Fiona Alcantar  32 y.o.  Chief Complaint   Patient presents with    Difficulty Breathing     Ambulatory to lobby with steady gait for above, pt reporting difficulty breathing, audible wheezing across triage room. Pt reporting no hx asthma, however it is in her chart. Pt does not have an inhaler. Pt reports feeling like her throat is tightening, symptoms starting around noon, reports that it is worsening. A&Ox4, speaking in full sentences, maintaining secretions, pt noted to have increased work of breathing. Charge RN notified.

## 2022-11-30 NOTE — ED NOTES
DR REICH AT BEDSIDE FOR ASSESSMENT.  IPAD USED FOR COMMUNICATION.     PT STATES SHE FEELS SOB AND THAT SHE FEELS LIKE IT IS DIFFICULT TO SWALLOW.  PT DENIES MEDICAL HX AND STATES SHE HAS HAD A COUGH.     PT STATES HX OF ASTHMA BUT HAS NOT HAD SYMPTOMS IN OVER 4 YEARS.

## 2022-11-30 NOTE — ED NOTES
PT IS RESTING IN BED. BREATHING IS EVEN AND UNLABORED, SKIN IS WARM AND DRY, VSS, NAD, WILL CONTINUE TO MONITOR.

## 2022-12-01 ENCOUNTER — PHARMACY VISIT (OUTPATIENT)
Dept: PHARMACY | Facility: MEDICAL CENTER | Age: 32
End: 2022-12-01
Payer: COMMERCIAL

## 2022-12-01 VITALS
RESPIRATION RATE: 14 BRPM | HEIGHT: 60 IN | HEART RATE: 64 BPM | SYSTOLIC BLOOD PRESSURE: 100 MMHG | OXYGEN SATURATION: 95 % | DIASTOLIC BLOOD PRESSURE: 62 MMHG | WEIGHT: 141.98 LBS | TEMPERATURE: 97.9 F | BODY MASS INDEX: 27.87 KG/M2

## 2022-12-01 PROBLEM — J96.01 ACUTE RESPIRATORY FAILURE WITH HYPOXIA (HCC): Status: RESOLVED | Noted: 2022-11-30 | Resolved: 2022-12-01

## 2022-12-01 LAB
ALBUMIN SERPL BCP-MCNC: 4.2 G/DL (ref 3.2–4.9)
ALBUMIN/GLOB SERPL: 1.2 G/DL
ALP SERPL-CCNC: 81 U/L (ref 30–99)
ALT SERPL-CCNC: 27 U/L (ref 2–50)
ANION GAP SERPL CALC-SCNC: 13 MMOL/L (ref 7–16)
AST SERPL-CCNC: 18 U/L (ref 12–45)
BASOPHILS # BLD AUTO: 0.1 % (ref 0–1.8)
BASOPHILS # BLD: 0.01 K/UL (ref 0–0.12)
BILIRUB SERPL-MCNC: 0.3 MG/DL (ref 0.1–1.5)
BUN SERPL-MCNC: 17 MG/DL (ref 8–22)
CALCIUM SERPL-MCNC: 9.1 MG/DL (ref 8.5–10.5)
CHLORIDE SERPL-SCNC: 105 MMOL/L (ref 96–112)
CO2 SERPL-SCNC: 21 MMOL/L (ref 20–33)
CREAT SERPL-MCNC: 0.52 MG/DL (ref 0.5–1.4)
EOSINOPHIL # BLD AUTO: 0.14 K/UL (ref 0–0.51)
EOSINOPHIL NFR BLD: 1.2 % (ref 0–6.9)
ERYTHROCYTE [DISTWIDTH] IN BLOOD BY AUTOMATED COUNT: 43.6 FL (ref 35.9–50)
GFR SERPLBLD CREATININE-BSD FMLA CKD-EPI: 127 ML/MIN/1.73 M 2
GLOBULIN SER CALC-MCNC: 3.4 G/DL (ref 1.9–3.5)
GLUCOSE SERPL-MCNC: 104 MG/DL (ref 65–99)
HCT VFR BLD AUTO: 37.1 % (ref 37–47)
HGB BLD-MCNC: 12.6 G/DL (ref 12–16)
IMM GRANULOCYTES # BLD AUTO: 0.04 K/UL (ref 0–0.11)
IMM GRANULOCYTES NFR BLD AUTO: 0.3 % (ref 0–0.9)
LYMPHOCYTES # BLD AUTO: 2.46 K/UL (ref 1–4.8)
LYMPHOCYTES NFR BLD: 21 % (ref 22–41)
MCH RBC QN AUTO: 31.6 PG (ref 27–33)
MCHC RBC AUTO-ENTMCNC: 34 G/DL (ref 33.6–35)
MCV RBC AUTO: 93 FL (ref 81.4–97.8)
MONOCYTES # BLD AUTO: 1.09 K/UL (ref 0–0.85)
MONOCYTES NFR BLD AUTO: 9.3 % (ref 0–13.4)
NEUTROPHILS # BLD AUTO: 7.95 K/UL (ref 2–7.15)
NEUTROPHILS NFR BLD: 68.1 % (ref 44–72)
NRBC # BLD AUTO: 0 K/UL
NRBC BLD-RTO: 0 /100 WBC
PLATELET # BLD AUTO: 410 K/UL (ref 164–446)
PMV BLD AUTO: 10 FL (ref 9–12.9)
POTASSIUM SERPL-SCNC: 3.5 MMOL/L (ref 3.6–5.5)
PROT SERPL-MCNC: 7.6 G/DL (ref 6–8.2)
RBC # BLD AUTO: 3.99 M/UL (ref 4.2–5.4)
SODIUM SERPL-SCNC: 139 MMOL/L (ref 135–145)
WBC # BLD AUTO: 11.7 K/UL (ref 4.8–10.8)

## 2022-12-01 PROCEDURE — 700102 HCHG RX REV CODE 250 W/ 637 OVERRIDE(OP)

## 2022-12-01 PROCEDURE — A9270 NON-COVERED ITEM OR SERVICE: HCPCS

## 2022-12-01 PROCEDURE — A9270 NON-COVERED ITEM OR SERVICE: HCPCS | Performed by: INTERNAL MEDICINE

## 2022-12-01 PROCEDURE — RXMED WILLOW AMBULATORY MEDICATION CHARGE

## 2022-12-01 PROCEDURE — 80053 COMPREHEN METABOLIC PANEL: CPT

## 2022-12-01 PROCEDURE — 700101 HCHG RX REV CODE 250: Performed by: INTERNAL MEDICINE

## 2022-12-01 PROCEDURE — 36415 COLL VENOUS BLD VENIPUNCTURE: CPT

## 2022-12-01 PROCEDURE — 99239 HOSP IP/OBS DSCHRG MGMT >30: CPT | Mod: GC | Performed by: INTERNAL MEDICINE

## 2022-12-01 PROCEDURE — 700102 HCHG RX REV CODE 250 W/ 637 OVERRIDE(OP): Performed by: INTERNAL MEDICINE

## 2022-12-01 PROCEDURE — 85025 COMPLETE CBC W/AUTO DIFF WBC: CPT

## 2022-12-01 PROCEDURE — 94640 AIRWAY INHALATION TREATMENT: CPT

## 2022-12-01 RX ORDER — POTASSIUM CHLORIDE 20 MEQ/1
40 TABLET, EXTENDED RELEASE ORAL ONCE
Status: COMPLETED | OUTPATIENT
Start: 2022-12-01 | End: 2022-12-01

## 2022-12-01 RX ORDER — ALBUTEROL SULFATE 90 UG/1
2 AEROSOL, METERED RESPIRATORY (INHALATION) EVERY 6 HOURS PRN
Qty: 8.5 G | Refills: 0 | Status: SHIPPED | OUTPATIENT
Start: 2022-12-01

## 2022-12-01 RX ADMIN — IPRATROPIUM BROMIDE AND ALBUTEROL SULFATE 3 ML: .5; 2.5 SOLUTION RESPIRATORY (INHALATION) at 07:09

## 2022-12-01 RX ADMIN — DEXAMETHASONE 6 MG: 6 TABLET ORAL at 05:25

## 2022-12-01 RX ADMIN — POTASSIUM CHLORIDE 40 MEQ: 1500 TABLET, EXTENDED RELEASE ORAL at 07:25

## 2022-12-01 ASSESSMENT — PATIENT HEALTH QUESTIONNAIRE - PHQ9
SUM OF ALL RESPONSES TO PHQ9 QUESTIONS 1 AND 2: 0
1. LITTLE INTEREST OR PLEASURE IN DOING THINGS: NOT AT ALL
2. FEELING DOWN, DEPRESSED, IRRITABLE, OR HOPELESS: NOT AT ALL

## 2022-12-01 ASSESSMENT — PAIN DESCRIPTION - PAIN TYPE: TYPE: ACUTE PAIN

## 2022-12-01 NOTE — DISCHARGE PLANNING
Meds-to-Beds: Discharge prescription order listed below delivered to NANCY Macias. Patient's bedside NANCY Amaya notified. Written information regarding the dispensed prescriptions was provided to the patient including the phone number of the pharmacy to call for any questions. Per NANCY Amaya, patient elected to have payment billed to patient account.     Current Outpatient Medications   Medication Sig Dispense Refill    albuterol 108 (90 Base) MCG/ACT Aero Soln inhalation aerosol Inhale 2 Puffs every 6 hours as needed for Shortness of Breath. 8.5 g 0      Isa Bone, PharmD

## 2022-12-01 NOTE — CARE PLAN
Problem: Pain - Standard  Goal: Alleviation of pain or a reduction in pain to the patient’s comfort goal  12/1/2022 0009 by Carmen Armendariz R.N.  Outcome: Progressing  Note: Pain acceptable 0-3  11/30/2022 2207 by Carmen Armendariz R.N.  Outcome: Progressing     Problem: Knowledge Deficit - Standard  Goal: Patient and family/care givers will demonstrate understanding of plan of care, disease process/condition, diagnostic tests and medications  12/1/2022 0009 by Carmen Armendariz R.N.  Outcome: Progressing  Note: Alert  line used to explain disease process  11/30/2022 2207 by Carmen Armendariz R.N.  Outcome: Not Met   The patient is Stable - Low risk of patient condition declining or worsening    Shift Goals  Clinical Goals: comfort  Patient Goals: decreased cough    Progress made toward(s) clinical / shift goals:        Patient is not progressing towards the following goals:      Problem: Knowledge Deficit - Standard  Goal: Patient and family/care givers will demonstrate understanding of plan of care, disease process/condition, diagnostic tests and medications  12/1/2022 0009 by Carmen Armendariz R.N.  Outcome: Progressing  Note: Alert  line used to explain disease process  11/30/2022 2207 by Carmen Armendariz R.N.  Outcome: Not Met

## 2022-12-01 NOTE — DISCHARGE PLANNING
Case Management Discharge Planning    Admission Date: 11/30/2022  GMLOS: 5.2  ALOS: 1    6-Clicks ADL Score: 24  6-Clicks Mobility Score: 24      Anticipated Discharge Dispo: Discharge Disposition: Discharged to home/self care (01)    DME Needed: No    Action(s) Taken: Updated Provider/Nurse on Discharge Plan    Escalations Completed: None    Medically Clear: Yes    Next Steps: Patient discussed during morning IDT rounds with team. Patient is medically cleared for discharge. Patient passed her walking test on RA. No needs from CM.  will be picking her up around 5pm today.     Barriers to Discharge: None    Is the patient up for discharge tomorrow: No, today

## 2022-12-01 NOTE — CARE PLAN
Problem: Knowledge Deficit - Standard  Goal: Patient and family/care givers will demonstrate understanding of plan of care, disease process/condition, diagnostic tests and medications  Outcome: Not Met   The patient is Stable - Low risk of patient condition declining or worsening    Shift Goals  Clinical Goals: comfort  Patient Goals: decreased cough    Progress made toward(s) clinical / shift goals:    Need .    Patient is not progressing towards the following goals:      Problem: Knowledge Deficit - Standard  Goal: Patient and family/care givers will demonstrate understanding of plan of care, disease process/condition, diagnostic tests and medications  Outcome: Not Met

## 2022-12-02 NOTE — DISCHARGE SUMMARY
[facility] Internal Medicine Discharge Summary    Attending: Fabricio Goldman M.d.  Senior Resident: Dr. Siddiqui  Intern:  Dr. Espinosa  Contact Number: [phone]    CHIEF COMPLAINT ON ADMISSION  Chief Complaint   Patient presents with    Difficulty Breathing       Reason for Admission  Difficulty Breathing      Admission Date  11/30/2022    CODE STATUS  Full Code    HPI & HOSPITAL COURSE  Ms. Braxton is a 32 y.o. female with past medical history of asthma who presented 11/30/2022 with complaints of shortness of breath, wheezing, cough with brown sputum, chills, malaise, chest discomfort with Cough.  She denied sick contact.  Upon evaluation, tested positive for COVID-19.  Chest x-ray negative for acute process.  X-ray of the neck was done due to subjective feeling of tightness in the neck, negative for acute abnormalities.  Patient noted to be mildly hypoxic with SPO2 88% on room air     Acute respiratory failure with hypoxia (HCC)  Assessment & Plan  Hypoxia secondary to COVID-19 infection and asthma exacerbation.  SPO2 88% on room air at rest.  Chest x-ray negative for consolidation.  Appears to be COVID-19 pneumonia, no wheezing on examination at the day of discharge, but given history of asthma without current treatment will give albuterol inhaler.  No need for continued course of steroids as symptoms have resolved.  Treated with IV Decadron until not hypoxic on discharge, saturating at 96% on room air and 91% on ambulation  - Strict return precautions given  - PCP to follow-up for resolution of COVID-19  - Isolation precautions explained to patient in native Bengali using   - Educated patient on incentive spirometry  - Follow-up with PCP for asthma, given inhaler      Therefore, she is discharged in fair and stable condition to home with close outpatient follow-up.    The patient recovered much more quickly than anticipated on admission.    Discharge Date  12/1/22    Physical Exam on Day of  Discharge  Physical Exam  General: Well developed, well nourished female, in no distress.  HEENT: NC/AT, PERRL, EOMI, no scleral icterus or conjunctival pallor, fair dentition/denture in, no nasal discharge or oral erythema or exudates.   Neck: Supple, No cervical or supraclavicular LAD  CV:RRR, no murmurs gallops or Rubs, no JVD  Pulm: LCAB, no crackles, rales, rhonchi, or wheezing  GI: Normal bowel sounds, abdomen soft, nontender, nondistended to deep or light palpation in all 4 quadrants, no HSM.  MSK: Radial and dorsalis pedis pulses 2+ and equal bilaterally, respectively.  Strength 5 out of 5 in upper and lower extremities.  No lower extremity edema  Neuro: Patient is alert and oriented x3, no focal deficits  Psych: Appropriate mood and affect     FOLLOW UP ITEMS POST DISCHARGE  -Follow-up in PCP in 1 to 2 weeks to make sure back to baseline health    DISCHARGE DIAGNOSES  Principal Problem:    COVID-19 POA: Yes  Resolved Problems:    Acute respiratory failure with hypoxia (HCC) POA: Unknown      FOLLOW UP  Follow-up with PCP in 1 to 2 weeks for baseline health  [facility], Emergency Dept  [address] [address] [phone]  Go to   As needed      MEDICATIONS ON DISCHARGE     Medication List        START taking these medications        Instructions   albuterol 108 (90 Base) MCG/ACT Aers inhalation aerosol   Inhale 2 Puffs every 6 hours as needed for Shortness of Breath.  Dose: 2 Puff            CONTINUE taking these medications        Instructions   acetaminophen 160 MG/5ML Susp  Commonly known as: TYLENOL   Take 320 mg by mouth every 6 hours as needed. Indications: Fever, Pain  Dose: 320 mg              Allergies  No Known Allergies    DIET  Orders Placed This Encounter   Procedures    Diet Order Diet: Regular     Standing Status:   Standing     Number of Occurrences:   1     Order Specific Question:   Diet:     Answer:   Regular [1]       ACTIVITY  As tolerated.  Weight  bearing as tolerated    CONSULTATIONS  none    PROCEDURES  none    LABORATORY  Lab Results   Component Value Date    SODIUM 139 12/01/2022    POTASSIUM 3.5 (L) 12/01/2022    CHLORIDE 105 12/01/2022    CO2 21 12/01/2022    GLUCOSE 104 (H) 12/01/2022    BUN 17 12/01/2022    CREATININE 0.52 12/01/2022        Lab Results   Component Value Date    WBC 11.7 (H) 12/01/2022    HEMOGLOBIN 12.6 12/01/2022    HEMATOCRIT 37.1 12/01/2022    PLATELETCT 410 12/01/2022        Total time of the discharge process exceeds 44 minutes.

## 2022-12-02 NOTE — PROGRESS NOTES
Pt will be discharging home  accompanied by  . She will be picked up @3860 . DC instructions were given using  service no. [id number] , verbalized understanding. IV is out and pt is ready to be picked up by her . Will cont. To monitor.

## 2022-12-02 NOTE — CARE PLAN
The patient is Stable - Low risk of patient condition declining or worsening    Shift Goals  Clinical Goals: Pt will not develop SOB/ resp. distress within shift  Patient Goals: adequate rest    Progress made toward(s) clinical / shift goals:  pt have no SOB within shift. No resp. Distress within shift.     Patient is not progressing towards the following goals:

## (undated) DEVICE — KIT ANESTHESIA W/CIRCUIT & 3/LT BAG W/FILTER (20EA/CA)

## (undated) DEVICE — SUTURE 0 VICRYL PLUS CT 36 (36PK/BX)"

## (undated) DEVICE — SET SUCTION/IRRIGATION WITH DISPOSABLE TIP (6/CA )PART #0250-070-520 IS A SUB

## (undated) DEVICE — SUTURE 1 CHROMIC CTX ETHICON - (36PK/BX)

## (undated) DEVICE — SLEEVE, VASO, THIGH, MED

## (undated) DEVICE — SET LEADWIRE 5 LEAD BEDSIDE DISPOSABLE ECG (1SET OF 5/EA)

## (undated) DEVICE — LACTATED RINGERS INJ 1000 ML - (14EA/CA 60CA/PF)

## (undated) DEVICE — SUTURE 0 COATED VICRYL 6-18IN - (12PK/BX)

## (undated) DEVICE — SUCTION INSTRUMENT YANKAUER BULBOUS TIP W/O VENT (50EA/CA)

## (undated) DEVICE — PACK LAP CHOLE OR - (2EA/CA)

## (undated) DEVICE — ELECTRODE 850 FOAM ADHESIVE - HYDROGEL RADIOTRNSPRNT (50/PK)

## (undated) DEVICE — TUBING CLEARLINK DUO-VENT - C-FLO (48EA/CA)

## (undated) DEVICE — TAPE CLOTH MEDIPORE 6 INCH - (12RL/CA)

## (undated) DEVICE — CLIP MED LG INTNL HRZN TI ESCP - (20/BX)

## (undated) DEVICE — DRESSING INTERCEED ABSORBABLE ADHESION BARRIER TC7 (10EA/CA)

## (undated) DEVICE — TROCAR 5X100 NON BLADED Z-TH - READ KII (6/BX)

## (undated) DEVICE — SENSOR SPO2 NEO LNCS ADHESIVE (20/BX) SEE USER NOTES

## (undated) DEVICE — SET EXTENSION WITH 2 PORTS (48EA/CA) ***PART #2C8610 IS A SUBSTITUTE*****

## (undated) DEVICE — KIT  I.V. START (100EA/CA)

## (undated) DEVICE — NEPTUNE 4 PORT MANIFOLD - (20/PK)

## (undated) DEVICE — PAD LAP STERILE 18 X 18 - (5/PK 40PK/CA)

## (undated) DEVICE — CATHETER IV NON-SAFETY 18 GA X 1 1/4 (50/BX 4BX/CA)

## (undated) DEVICE — WATER IRRIGATION STERILE 1000ML (12EA/CA)

## (undated) DEVICE — ELECTRODE DUAL RETURN W/ CORD - (50/PK)

## (undated) DEVICE — SUTURE GENERAL

## (undated) DEVICE — TROCAR Z THREAD11MM OPTICAL - NON BLADED(6/BX)

## (undated) DEVICE — MASK ANESTHESIA ADULT  - (100/CA)

## (undated) DEVICE — SODIUM CHL IRRIGATION 0.9% 1000ML (12EA/CA)

## (undated) DEVICE — SET TUBING PNEUMOCLEAR HIGH FLOW SMOKE EVACUATION (10EA/BX)

## (undated) DEVICE — CANISTER SUCTION 3000ML MECHANICAL FILTER AUTO SHUTOFF MEDI-VAC NONSTERILE LF DISP  (40EA/CA)

## (undated) DEVICE — SUTURE 4-0 MONOCRYL PLUS PS-2 - 27 INCH (36/BX)

## (undated) DEVICE — TRAY SPINAL ANESTHESIA NON-SAFETY (10/CA)

## (undated) DEVICE — HEAD HOLDER JUNIOR/ADULT

## (undated) DEVICE — GLOVE BIOGEL SZ 8 SURGICAL PF LTX - (50PR/BX 4BX/CA)

## (undated) DEVICE — SUTURE 0 VICRYL PLUS CT-1 - 36 INCH (36/BX)

## (undated) DEVICE — DETERGENT RENUZYME PLUS 10 OZ PACKET (50/BX)

## (undated) DEVICE — TUBE CONNECT SUCTION CLEAR 120 X 1/4" (50EA/CA)"

## (undated) DEVICE — SUTURE 2-0 CHROMIC GUT CT-1 27 (36PK/BX)"

## (undated) DEVICE — BLANKET UNDERBODY FULL ACCES - (5/CA)

## (undated) DEVICE — CHLORAPREP 26 ML APPLICATOR - ORANGE TINT(25/CA)

## (undated) DEVICE — GLOVE BIOGEL PI ORTHO SZ 8 PF LF (40PR/BX)

## (undated) DEVICE — TOWEL STOP TIMEOUT SAFETY FLAG (40EA/CA)

## (undated) DEVICE — NEEDLE INSFL 120MM 14GA VRRS - (20/BX)

## (undated) DEVICE — GOWN WARMING STANDARD FLEX - (30/CA)

## (undated) DEVICE — SUTURE 0 VICRYL PLUS UR-6 - 27 INCH (36/BX)

## (undated) DEVICE — PROTECTOR ULNA NERVE - (36PR/CA)

## (undated) DEVICE — SLEEVE, SEQUENTIAL CALF REG

## (undated) DEVICE — STAPLER SKIN DISP - (6/BX 10BX/CA) VISISTAT

## (undated) DEVICE — SUTURE 3-0 VICRYL PLUS CT-1 - 36 INCH (36/BX)

## (undated) DEVICE — CANNULA W/SEAL 5X100 Z-THRE - ADED KII (12/BX)

## (undated) DEVICE — SYRINGE 30 ML LL (56/BX)

## (undated) DEVICE — PACK C-SECTION (2EA/CA)